# Patient Record
Sex: MALE | URBAN - METROPOLITAN AREA
[De-identification: names, ages, dates, MRNs, and addresses within clinical notes are randomized per-mention and may not be internally consistent; named-entity substitution may affect disease eponyms.]

---

## 2023-06-08 ENCOUNTER — INPATIENT (INPATIENT)
Facility: HOSPITAL | Age: 68
LOS: 5 days | Discharge: NURSING, REHAB OR LONG-TERM CARE | DRG: 640 | End: 2023-06-14
Attending: STUDENT IN AN ORGANIZED HEALTH CARE EDUCATION/TRAINING PROGRAM | Admitting: STUDENT IN AN ORGANIZED HEALTH CARE EDUCATION/TRAINING PROGRAM
Payer: MEDICARE

## 2023-06-08 VITALS
TEMPERATURE: 97 F | HEART RATE: 76 BPM | SYSTOLIC BLOOD PRESSURE: 189 MMHG | RESPIRATION RATE: 20 BRPM | DIASTOLIC BLOOD PRESSURE: 117 MMHG

## 2023-06-08 DIAGNOSIS — E87.1 HYPO-OSMOLALITY AND HYPONATREMIA: ICD-10-CM

## 2023-06-08 LAB
ALBUMIN SERPL ELPH-MCNC: 4.1 G/DL — SIGNIFICANT CHANGE UP (ref 3.3–5)
ALP SERPL-CCNC: 55 U/L — SIGNIFICANT CHANGE UP (ref 40–120)
ALT FLD-CCNC: 18 U/L — SIGNIFICANT CHANGE UP (ref 10–45)
ANION GAP SERPL CALC-SCNC: 14 MMOL/L — SIGNIFICANT CHANGE UP (ref 5–17)
APPEARANCE UR: CLEAR — SIGNIFICANT CHANGE UP
APTT BLD: 23.9 SEC — LOW (ref 27.5–35.5)
AST SERPL-CCNC: 22 U/L — SIGNIFICANT CHANGE UP (ref 10–40)
BACTERIA # UR AUTO: NEGATIVE — SIGNIFICANT CHANGE UP
BASE EXCESS BLDMV CALC-SCNC: -2.2 MMOL/L — SIGNIFICANT CHANGE UP (ref -3–3)
BASOPHILS # BLD AUTO: 0 K/UL — SIGNIFICANT CHANGE UP (ref 0–0.2)
BASOPHILS NFR BLD AUTO: 0 % — SIGNIFICANT CHANGE UP (ref 0–2)
BILIRUB SERPL-MCNC: 1.8 MG/DL — HIGH (ref 0.2–1.2)
BILIRUB UR-MCNC: NEGATIVE — SIGNIFICANT CHANGE UP
BUN SERPL-MCNC: 27 MG/DL — HIGH (ref 7–23)
CALCIUM SERPL-MCNC: 9 MG/DL — SIGNIFICANT CHANGE UP (ref 8.4–10.5)
CHLORIDE SERPL-SCNC: 87 MMOL/L — LOW (ref 96–108)
CO2 BLDMV-SCNC: 26 MMOL/L — SIGNIFICANT CHANGE UP (ref 21–29)
CO2 SERPL-SCNC: 19 MMOL/L — LOW (ref 22–31)
COHGB MFR BLDMV: 0.9 % — SIGNIFICANT CHANGE UP (ref 0–3)
COLOR SPEC: SIGNIFICANT CHANGE UP
CREAT ?TM UR-MCNC: 32 MG/DL — SIGNIFICANT CHANGE UP
CREAT SERPL-MCNC: 1.28 MG/DL — SIGNIFICANT CHANGE UP (ref 0.5–1.3)
DIFF PNL FLD: NEGATIVE — SIGNIFICANT CHANGE UP
EGFR: 61 ML/MIN/1.73M2 — SIGNIFICANT CHANGE UP
EOSINOPHIL # BLD AUTO: 0 K/UL — SIGNIFICANT CHANGE UP (ref 0–0.5)
EOSINOPHIL NFR BLD AUTO: 0 % — SIGNIFICANT CHANGE UP (ref 0–6)
EPI CELLS # UR: 0 /HPF — SIGNIFICANT CHANGE UP
GLUCOSE SERPL-MCNC: 121 MG/DL — HIGH (ref 70–99)
GLUCOSE UR QL: NEGATIVE — SIGNIFICANT CHANGE UP
HCO3 BLDMV-SCNC: 24 MMOL/L — SIGNIFICANT CHANGE UP (ref 20–28)
HCT VFR BLD CALC: 35.8 % — LOW (ref 39–50)
HGB BLD-MCNC: 12.1 G/DL — LOW (ref 13–17)
HGB FLD-MCNC: 13.5 G/DL — SIGNIFICANT CHANGE UP (ref 12.6–17.4)
HYALINE CASTS # UR AUTO: 0 /LPF — SIGNIFICANT CHANGE UP (ref 0–2)
INR BLD: 1.1 RATIO — SIGNIFICANT CHANGE UP (ref 0.88–1.16)
KETONES UR-MCNC: NEGATIVE — SIGNIFICANT CHANGE UP
LEUKOCYTE ESTERASE UR-ACNC: NEGATIVE — SIGNIFICANT CHANGE UP
LYMPHOCYTES # BLD AUTO: 0.5 K/UL — LOW (ref 1–3.3)
LYMPHOCYTES # BLD AUTO: 2.5 % — LOW (ref 13–44)
MANUAL SMEAR VERIFICATION: SIGNIFICANT CHANGE UP
MCHC RBC-ENTMCNC: 32.5 PG — SIGNIFICANT CHANGE UP (ref 27–34)
MCHC RBC-ENTMCNC: 33.8 GM/DL — SIGNIFICANT CHANGE UP (ref 32–36)
MCV RBC AUTO: 96.2 FL — SIGNIFICANT CHANGE UP (ref 80–100)
METHGB MFR BLDMV: 0.3 % — SIGNIFICANT CHANGE UP (ref 0–1.5)
MONOCYTES # BLD AUTO: 0.34 K/UL — SIGNIFICANT CHANGE UP (ref 0–0.9)
MONOCYTES NFR BLD AUTO: 1.7 % — LOW (ref 2–14)
NEUTROPHILS # BLD AUTO: 19.17 K/UL — HIGH (ref 1.8–7.4)
NEUTROPHILS NFR BLD AUTO: 95 % — HIGH (ref 43–77)
NEUTS BAND # BLD: 0.8 % — SIGNIFICANT CHANGE UP (ref 0–8)
NITRITE UR-MCNC: NEGATIVE — SIGNIFICANT CHANGE UP
O2 CT VFR BLD CALC: 20 MMHG — LOW (ref 30–65)
O2 CT VFR BLDMV CALC: 5.4 ML/DL — LOW (ref 18–22)
OSMOLALITY UR: 300 MOS/KG — SIGNIFICANT CHANGE UP (ref 300–900)
OXYHGB MFR BLDMV: 28.2 % — LOW (ref 90–95)
PCO2 BLDMV: 47 MMHG — SIGNIFICANT CHANGE UP (ref 30–65)
PH BLDMV: 7.32 — SIGNIFICANT CHANGE UP (ref 7.32–7.45)
PH UR: 7 — SIGNIFICANT CHANGE UP (ref 5–8)
PLAT MORPH BLD: NORMAL — SIGNIFICANT CHANGE UP
PLATELET # BLD AUTO: 183 K/UL — SIGNIFICANT CHANGE UP (ref 150–400)
POTASSIUM SERPL-MCNC: 4.2 MMOL/L — SIGNIFICANT CHANGE UP (ref 3.5–5.3)
POTASSIUM SERPL-SCNC: 4.2 MMOL/L — SIGNIFICANT CHANGE UP (ref 3.5–5.3)
POTASSIUM UR-SCNC: 26 MMOL/L — SIGNIFICANT CHANGE UP
PROT ?TM UR-MCNC: 36 MG/DL — HIGH (ref 0–12)
PROT SERPL-MCNC: 6.7 G/DL — SIGNIFICANT CHANGE UP (ref 6–8.3)
PROT UR-MCNC: ABNORMAL
PROT/CREAT UR-RTO: 1.1 RATIO — HIGH (ref 0–0.2)
PROTHROM AB SERPL-ACNC: 12.7 SEC — SIGNIFICANT CHANGE UP (ref 10.5–13.4)
RBC # BLD: 3.72 M/UL — LOW (ref 4.2–5.8)
RBC # FLD: 11.8 % — SIGNIFICANT CHANGE UP (ref 10.3–14.5)
RBC BLD AUTO: SIGNIFICANT CHANGE UP
RBC CASTS # UR COMP ASSIST: 0 /HPF — SIGNIFICANT CHANGE UP (ref 0–4)
SAO2 % BLDMV: 28.5 % — LOW (ref 60–90)
SODIUM SERPL-SCNC: 120 MMOL/L — CRITICAL LOW (ref 135–145)
SODIUM UR-SCNC: 60 MMOL/L — SIGNIFICANT CHANGE UP
SP GR SPEC: 1.01 — SIGNIFICANT CHANGE UP (ref 1.01–1.02)
TROPONIN T, HIGH SENSITIVITY RESULT: 11 NG/L — SIGNIFICANT CHANGE UP (ref 0–51)
UROBILINOGEN FLD QL: NEGATIVE — SIGNIFICANT CHANGE UP
WBC # BLD: 20.01 K/UL — HIGH (ref 3.8–10.5)
WBC # FLD AUTO: 20.01 K/UL — HIGH (ref 3.8–10.5)
WBC UR QL: 0 /HPF — SIGNIFICANT CHANGE UP (ref 0–5)

## 2023-06-08 PROCEDURE — 71045 X-RAY EXAM CHEST 1 VIEW: CPT | Mod: 26

## 2023-06-08 PROCEDURE — 99285 EMERGENCY DEPT VISIT HI MDM: CPT

## 2023-06-08 PROCEDURE — 70450 CT HEAD/BRAIN W/O DYE: CPT | Mod: 26,MA,XU

## 2023-06-08 PROCEDURE — 70496 CT ANGIOGRAPHY HEAD: CPT | Mod: 26,MA

## 2023-06-08 PROCEDURE — 70498 CT ANGIOGRAPHY NECK: CPT | Mod: 26,MA

## 2023-06-08 PROCEDURE — 0042T: CPT | Mod: MA

## 2023-06-08 NOTE — ED PROVIDER NOTE - ATTENDING CONTRIBUTION TO CARE
68-year-old male history of CAD with prior CABG, hypertension, hyperlipidemia on aspirin brought in by EMS from home for confusion.  Patient currently living alone and wife living out of state.  Wife thought patient seemed confused over the phone and EMS was called.  Patient unable to contribute to history given confusion, oriented x2 on arrival.  Initially called as a code stroke. Will obtain labs, urinalysis, CT brain imaging, discussed with wife for additional collateral information, anticipate admission

## 2023-06-08 NOTE — ED PROVIDER NOTE - OBJECTIVE STATEMENT
67 y/o m hx htn hld previous cardiac surgery, on asa no blood thinners here for apparent confusion. Living at home alone, got a phone call from friend/wife, did not recognize them on the phone, appeared confused, they called EMS who states pt did not know where he was when they got there. Unclear onset of sx, call was one hour ago but patient unable to state when the last time he spoke with anyone was, phone call to wife went to  here. Pt only complaint is constipation, feels a little confused. no focal pain. no known trama.

## 2023-06-08 NOTE — STROKE CODE NOTE - NIH STROKE SCALE: 1C. LOC COMMANDS, QM
[place] - Nurse from Dr. Pereira Laughter office called to request progress notes from visit on 12/22/16 and lab results from 2/14/17 - requested notes were faxed to [phone]
(0) Performs both tasks correctly

## 2023-06-08 NOTE — ED ADULT NURSE NOTE - OBJECTIVE STATEMENT
67 yo male with pmh HTN, HLD, CAD on ASA presents to ED by EMS for confusion. As per EMS, pt's friend found him confused at home around 1600, unknown LKW. As per EMS, pt "didn't recognize friend, and he didn't know where he was when we got there." Pt a&ox2 disoriented to year, speech clear but aphasia noted, able to move all extremities and follow commands, skin warm dry and appropriate color, sensation intact. Code stroke activated at 1714, pt brought to CT scan with RN, ED MD, and neuro MD. Pt safety measures in place and comfort provided.   Break coverage RN 69 yo male with pmh HTN, HLD, CAD on ASA presents to ED by EMS for confusion. As per EMS, pt's friend found him confused at home around 1600, unknown LKW. As per EMS, pt "didn't recognize friend, and he didn't know where he was when we got there." Pt a&ox2 disoriented to year, aphasia noted, able to move all extremities and follow commands, skin warm dry and appropriate color, sensation intact. Code stroke activated at 1714, pt brought to CT scan with RN, ED MD, and neuro MD. Pt safety measures in place and comfort provided.   Break coverage RN

## 2023-06-08 NOTE — ED PROVIDER NOTE - CLINICAL SUMMARY MEDICAL DECISION MAKING FREE TEXT BOX
pt with vascular rf, on asa, presents with confusion and word finding difficulty. onset at least 1 hour but unclear when LKN. not candidate for tpa given duration + NIH at most 1 for word finding difficulty. will w/u for stroke vs tox/metabolic, ?carbon monoxide, cardiac, infectious etiology. needs more collateral from wife, did not answer phone on first attempt.

## 2023-06-08 NOTE — ED ADULT NURSE REASSESSMENT NOTE - GLASGOW COMA SCALE: BEST VERBAL RESPONSE
(V5) oriented
Implemented All Fall with Harm Risk Interventions:  [place] to call system. Call bell, personal items and telephone within reach. Instruct patient to call for assistance. Room bathroom lighting operational. Non-slip footwear when patient is off stretcher. Physically safe environment: no spills, clutter or unnecessary equipment. Stretcher in lowest position, wheels locked, appropriate side rails in place. Provide visual cue, wrist band, yellow gown, etc. Monitor gait and stability. Monitor for mental status changes and reorient to person, place, and time. Review medications for side effects contributing to fall risk. Reinforce activity limits and safety measures with patient and family. Provide visual clues: red socks.

## 2023-06-08 NOTE — ED PROVIDER NOTE - PHYSICAL EXAMINATION
Vitals: I have reviewed the patients vital signs  General: Well dressed, well appearing, no acute distress  HEENT: Atraumatic, normocephalic, airway patent  Neck: no tracheal deviation, no JVD  Chest/Lungs: no trauma, symmetric chest rise, speaking in complete sentences, no WOB  Heart: skin and extremities well perfused, regular rate and rhythm  Neuro: A+Ox2-3 (knows name, birthday, month but not year, repeats words when asked questions), CN II-XII intact, speech coherent and non dysarthric but he does pause to find words at time gait deferred for code stroke finger-nose and heel-shin testing normal. Muscle strength at baseline in all extremities  Eyes: PERRL, EOMI, no conjunctival injection   MSK: strength at baseline in all extremities, no muscle wasting or atrophy  Skin: no cyanosis, no jaundice, no new emergent lesions

## 2023-06-08 NOTE — ED ADULT NURSE REASSESSMENT NOTE - NURSING NEURO ORIENTATION
oriented to person, place and time
disoriented to place/disoriented to time/situation

## 2023-06-08 NOTE — ED ADULT NURSE NOTE - NSFALLRISKINTERV_ED_ALL_ED
Assistance OOB with selected safe patient handling equipment if applicable/Communicate fall risk and risk factors to all staff, patient, and family/Monitor for mental status changes and reorient to person, place, and time, as needed/Move patient closer to nursing station/within visual sight of ED staff/Provide patient with walking aids/Provide visual cue: yellow wristband, yellow gown, etc/Reinforce activity limits and safety measures with patient and family/Toileting schedule using arm’s reach rule for commode and bathroom/Use of alarms - bed, stretcher, chair and/or video monitoring/Call bell, personal items and telephone in reach/Instruct patient to call for assistance before getting out of bed/chair/stretcher/Non-slip footwear applied when patient is off stretcher/[place] to call system/Physically safe environment - no spills, clutter or unnecessary equipment/Purposeful Proactive Rounding/Room/bathroom lighting operational, light cord in reach

## 2023-06-08 NOTE — ED PROVIDER NOTE - PROGRESS NOTE DETAILS
Spoke to patient's wife, Ambika Michael, on the phone [phone].  As per his wife patient with last known normal today at 2 PM when she spoke with him over the phone.  Wife is currently in [place] and patient has been residing here for the last 50 days.  He appeared altered during the conversation she had with him on the phone at 4 PM today.  Patient has a history of atrial myxoma, CAD with prior CABG, remote cholecystectomy, hypertension hyperlipidemia and is on a baby aspirin daily.  Patient's wife denies similar prior episodes.  Of note, he has been complaining of recent constipation with difficulty having bowel movements.  Patient's wife denies the use of over-the-counter medications.  Patient's primary care doctor is Dr. José Miguel wesley who practices in [place], phone #[phone].  Patient's wife updated on his status and need for admission for further work-up.

## 2023-06-08 NOTE — ED ADULT NURSE NOTE - CAS EDN DISCHARGE INTERVENTIONS
IV intact Chonodrocutaneous Helical Advancement Flap Text: The defect edges were debeveled with a #15 scalpel blade.  Given the location of the defect and the proximity to free margins a chondrocutaneous helical advancement flap was deemed most appropriate.  Using a sterile surgical marker, the appropriate advancement flap was drawn incorporating the defect and placing the expected incisions within the relaxed skin tension lines where possible.    The area thus outlined was incised deep to adipose tissue with a #15 scalpel blade.  The skin margins were undermined to an appropriate distance in all directions utilizing iris scissors.

## 2023-06-08 NOTE — STROKE CODE NOTE - NSMDCONSULT QTN_Y FT
Code stroke called for confusion and word difficulty. Symptoms noticed around 1615 when friend found him confused at home and called EMS. Unknown Last Known Normal. Multiple attempts made to reach patient's family over phone but unable to contact. Wife was called at  multiple times but did not  phone. Due to unknown last known well, patient not meeting criteria for tenecteplase at this time and Code stroke was cancelled with agreement of stroke team and ED physician. Patient has 5/5 strength upper and lower extremities, no sensory deficit, VFF to confrontation, no dysmetria FTN. Does have some word-finding difficulty and mixing up his words. Following commands but perseverates. Labwork notable for WBC 20, hyponatremia. CTH no bleed. Not a tenecteplase or thrombectomy candidate at this time for unknown LKN. Case discussed with stroke fellow on call. Code stroke called for confusion and word difficulty. Symptoms noticed around 1615 when friend found him confused at home and called EMS. Unknown Last Known Normal. Multiple attempts made to reach patient's family over phone but unable to contact. Wife was called at  multiple times but did not  phone. Patient's main complaint at this time is constipation, denies any headache, any weakness or sensory deficits, room-spinning dizziness or vision changes. Due to unknown last known well, patient not meeting criteria for tenecteplase at this time and Code stroke was cancelled with agreement of stroke team and ED physician. Patient has 5/5 strength upper and lower extremities, no sensory deficit, VFF to confrontation, no dysmetria FTN. Does have some word-finding difficulty and mixing up his words. Following commands but perseverates. Labwork notable for WBC 20, hyponatremia. CTH no bleed. Not a tenecteplase or thrombectomy candidate at this time for unknown LKN. Case discussed with stroke fellow on call.    Constitutional: well-developed, well-nourished, well-groomed  Mental Status: Alert, awake, oriented to person, place [facility] [place], and Month year but not date. Speech: clearly has some word-finding difficulty with intact naming, able to repeat, follows 1-step commands.   Cranial Nerves:  II: Visual fields are full to confrontation; pupils are equal, round, and reactive to light   III, IV, VI: Extraocular movements are intact without nystagmus  V: Facial sensation is intact in the V1-V3 distribution bilaterally  VII: Face is symmetric with normal eye closure and smile  VIII: Hearing is intact to finger rub  IX, X: Uvula is midline  XI: Shoulder shrug intact  XII: Tongue protrudes in the midline    - Motor/Strength Testing:                                 Right           Left  Deltoid                     5                 5  Biceps                      5                 5  Triceps                     5                 5  Hand                  5                 5    Hip Flex                   5                  5  Knee Ext	                 5                  5  Dorsiflex                  5                  5  Plantarflex               5                  5    There is no pronator drift. Normal muscle bulk and tone throughout.  Sensory: Intact throughout to light touch.   Coordination: Finger-nose-finger intact without ataxia or dysmetria.      Labs remarkable for leukocytosis and hyponatremia.   Recommend toxic/metabolic/infectious workup and treatment of underlying causes.   Follow up on official CTA read.   Please consult us if concern for neurologic causes of speech disturbance after treatment of toxic/metabolic/infectious causes.   Please page [phone] with any further neurological questions.

## 2023-06-09 DIAGNOSIS — E78.5 HYPERLIPIDEMIA, UNSPECIFIED: ICD-10-CM

## 2023-06-09 DIAGNOSIS — I10 ESSENTIAL (PRIMARY) HYPERTENSION: ICD-10-CM

## 2023-06-09 DIAGNOSIS — G93.40 ENCEPHALOPATHY, UNSPECIFIED: ICD-10-CM

## 2023-06-09 DIAGNOSIS — D72.829 ELEVATED WHITE BLOOD CELL COUNT, UNSPECIFIED: ICD-10-CM

## 2023-06-09 DIAGNOSIS — R09.89 OTHER SPECIFIED SYMPTOMS AND SIGNS INVOLVING THE CIRCULATORY AND RESPIRATORY SYSTEMS: ICD-10-CM

## 2023-06-09 DIAGNOSIS — E87.1 HYPO-OSMOLALITY AND HYPONATREMIA: ICD-10-CM

## 2023-06-09 DIAGNOSIS — Z90.49 ACQUIRED ABSENCE OF OTHER SPECIFIED PARTS OF DIGESTIVE TRACT: Chronic | ICD-10-CM

## 2023-06-09 DIAGNOSIS — I25.10 ATHEROSCLEROTIC HEART DISEASE OF NATIVE CORONARY ARTERY WITHOUT ANGINA PECTORIS: ICD-10-CM

## 2023-06-09 DIAGNOSIS — Z29.9 ENCOUNTER FOR PROPHYLACTIC MEASURES, UNSPECIFIED: ICD-10-CM

## 2023-06-09 LAB
ALBUMIN SERPL ELPH-MCNC: 3.9 G/DL — SIGNIFICANT CHANGE UP (ref 3.3–5)
ALP SERPL-CCNC: 50 U/L — SIGNIFICANT CHANGE UP (ref 40–120)
ALT FLD-CCNC: 14 U/L — SIGNIFICANT CHANGE UP (ref 10–45)
ANION GAP SERPL CALC-SCNC: 13 MMOL/L — SIGNIFICANT CHANGE UP (ref 5–17)
ANION GAP SERPL CALC-SCNC: 14 MMOL/L — SIGNIFICANT CHANGE UP (ref 5–17)
AST SERPL-CCNC: 17 U/L — SIGNIFICANT CHANGE UP (ref 10–40)
BASOPHILS # BLD AUTO: 0.02 K/UL — SIGNIFICANT CHANGE UP (ref 0–0.2)
BASOPHILS NFR BLD AUTO: 0.2 % — SIGNIFICANT CHANGE UP (ref 0–2)
BILIRUB SERPL-MCNC: 2.9 MG/DL — HIGH (ref 0.2–1.2)
BUN SERPL-MCNC: 20 MG/DL — SIGNIFICANT CHANGE UP (ref 7–23)
BUN SERPL-MCNC: 26 MG/DL — HIGH (ref 7–23)
CALCIUM SERPL-MCNC: 9.1 MG/DL — SIGNIFICANT CHANGE UP (ref 8.4–10.5)
CALCIUM SERPL-MCNC: 9.1 MG/DL — SIGNIFICANT CHANGE UP (ref 8.4–10.5)
CHLORIDE SERPL-SCNC: 90 MMOL/L — LOW (ref 96–108)
CHLORIDE SERPL-SCNC: 92 MMOL/L — LOW (ref 96–108)
CO2 SERPL-SCNC: 19 MMOL/L — LOW (ref 22–31)
CO2 SERPL-SCNC: 22 MMOL/L — SIGNIFICANT CHANGE UP (ref 22–31)
CREAT SERPL-MCNC: 1.25 MG/DL — SIGNIFICANT CHANGE UP (ref 0.5–1.3)
CREAT SERPL-MCNC: 1.47 MG/DL — HIGH (ref 0.5–1.3)
EGFR: 52 ML/MIN/1.73M2 — LOW
EGFR: 63 ML/MIN/1.73M2 — SIGNIFICANT CHANGE UP
EOSINOPHIL # BLD AUTO: 0.13 K/UL — SIGNIFICANT CHANGE UP (ref 0–0.5)
EOSINOPHIL NFR BLD AUTO: 1.2 % — SIGNIFICANT CHANGE UP (ref 0–6)
GLUCOSE SERPL-MCNC: 101 MG/DL — HIGH (ref 70–99)
GLUCOSE SERPL-MCNC: 93 MG/DL — SIGNIFICANT CHANGE UP (ref 70–99)
HCT VFR BLD CALC: 35 % — LOW (ref 39–50)
HCV AB S/CO SERPL IA: 0.12 S/CO — SIGNIFICANT CHANGE UP (ref 0–0.99)
HCV AB SERPL-IMP: SIGNIFICANT CHANGE UP
HGB BLD-MCNC: 12.1 G/DL — LOW (ref 13–17)
IMM GRANULOCYTES NFR BLD AUTO: 0.4 % — SIGNIFICANT CHANGE UP (ref 0–0.9)
LEGIONELLA AG UR QL: NEGATIVE — SIGNIFICANT CHANGE UP
LYMPHOCYTES # BLD AUTO: 1.25 K/UL — SIGNIFICANT CHANGE UP (ref 1–3.3)
LYMPHOCYTES # BLD AUTO: 11.2 % — LOW (ref 13–44)
MAGNESIUM SERPL-MCNC: 1.8 MG/DL — SIGNIFICANT CHANGE UP (ref 1.6–2.6)
MCHC RBC-ENTMCNC: 32.4 PG — SIGNIFICANT CHANGE UP (ref 27–34)
MCHC RBC-ENTMCNC: 34.6 GM/DL — SIGNIFICANT CHANGE UP (ref 32–36)
MCV RBC AUTO: 93.8 FL — SIGNIFICANT CHANGE UP (ref 80–100)
MONOCYTES # BLD AUTO: 0.79 K/UL — SIGNIFICANT CHANGE UP (ref 0–0.9)
MONOCYTES NFR BLD AUTO: 7 % — SIGNIFICANT CHANGE UP (ref 2–14)
NEUTROPHILS # BLD AUTO: 8.97 K/UL — HIGH (ref 1.8–7.4)
NEUTROPHILS NFR BLD AUTO: 80 % — HIGH (ref 43–77)
NRBC # BLD: 0 /100 WBCS — SIGNIFICANT CHANGE UP (ref 0–0)
PLATELET # BLD AUTO: 162 K/UL — SIGNIFICANT CHANGE UP (ref 150–400)
POTASSIUM SERPL-MCNC: 4.2 MMOL/L — SIGNIFICANT CHANGE UP (ref 3.5–5.3)
POTASSIUM SERPL-MCNC: 4.3 MMOL/L — SIGNIFICANT CHANGE UP (ref 3.5–5.3)
POTASSIUM SERPL-SCNC: 4.2 MMOL/L — SIGNIFICANT CHANGE UP (ref 3.5–5.3)
POTASSIUM SERPL-SCNC: 4.3 MMOL/L — SIGNIFICANT CHANGE UP (ref 3.5–5.3)
PROCALCITONIN SERPL-MCNC: 0.43 NG/ML — HIGH (ref 0.02–0.1)
PROT SERPL-MCNC: 6.3 G/DL — SIGNIFICANT CHANGE UP (ref 6–8.3)
RBC # BLD: 3.73 M/UL — LOW (ref 4.2–5.8)
RBC # FLD: 11.7 % — SIGNIFICANT CHANGE UP (ref 10.3–14.5)
SODIUM SERPL-SCNC: 123 MMOL/L — LOW (ref 135–145)
SODIUM SERPL-SCNC: 127 MMOL/L — LOW (ref 135–145)
TSH SERPL-MCNC: 1.1 UIU/ML — SIGNIFICANT CHANGE UP (ref 0.27–4.2)
TSH SERPL-MCNC: 1.18 UIU/ML — SIGNIFICANT CHANGE UP (ref 0.27–4.2)
URATE SERPL-MCNC: 4.9 MG/DL — SIGNIFICANT CHANGE UP (ref 3.4–8.8)
UUN UR-MCNC: 303 MG/DL — SIGNIFICANT CHANGE UP
WBC # BLD: 11.21 K/UL — HIGH (ref 3.8–10.5)
WBC # FLD AUTO: 11.21 K/UL — HIGH (ref 3.8–10.5)

## 2023-06-09 PROCEDURE — 99223 1ST HOSP IP/OBS HIGH 75: CPT

## 2023-06-09 RX ORDER — LANOLIN ALCOHOL/MO/W.PET/CERES
3 CREAM (GRAM) TOPICAL AT BEDTIME
Refills: 0 | Status: DISCONTINUED | OUTPATIENT
Start: 2023-06-09 | End: 2023-06-14

## 2023-06-09 RX ORDER — ASPIRIN/CALCIUM CARB/MAGNESIUM 324 MG
81 TABLET ORAL DAILY
Refills: 0 | Status: DISCONTINUED | OUTPATIENT
Start: 2023-06-09 | End: 2023-06-14

## 2023-06-09 RX ORDER — ATORVASTATIN CALCIUM 80 MG/1
40 TABLET, FILM COATED ORAL DAILY
Refills: 0 | Status: DISCONTINUED | OUTPATIENT
Start: 2023-06-09 | End: 2023-06-14

## 2023-06-09 RX ORDER — LOSARTAN POTASSIUM 100 MG/1
100 TABLET, FILM COATED ORAL DAILY
Refills: 0 | Status: DISCONTINUED | OUTPATIENT
Start: 2023-06-09 | End: 2023-06-14

## 2023-06-09 RX ORDER — ACETAMINOPHEN 500 MG
650 TABLET ORAL EVERY 6 HOURS
Refills: 0 | Status: DISCONTINUED | OUTPATIENT
Start: 2023-06-09 | End: 2023-06-14

## 2023-06-09 RX ORDER — CHOLECALCIFEROL (VITAMIN D3) 125 MCG
2000 CAPSULE ORAL DAILY
Refills: 0 | Status: DISCONTINUED | OUTPATIENT
Start: 2023-06-09 | End: 2023-06-14

## 2023-06-09 RX ORDER — SODIUM CHLORIDE 9 MG/ML
1000 INJECTION INTRAMUSCULAR; INTRAVENOUS; SUBCUTANEOUS
Refills: 0 | Status: DISCONTINUED | OUTPATIENT
Start: 2023-06-09 | End: 2023-06-11

## 2023-06-09 RX ADMIN — Medication 81 MILLIGRAM(S): at 12:34

## 2023-06-09 RX ADMIN — SODIUM CHLORIDE 50 MILLILITER(S): 9 INJECTION INTRAMUSCULAR; INTRAVENOUS; SUBCUTANEOUS at 19:10

## 2023-06-09 RX ADMIN — Medication 2000 UNIT(S): at 12:35

## 2023-06-09 RX ADMIN — ATORVASTATIN CALCIUM 40 MILLIGRAM(S): 80 TABLET, FILM COATED ORAL at 12:35

## 2023-06-09 RX ADMIN — LOSARTAN POTASSIUM 100 MILLIGRAM(S): 100 TABLET, FILM COATED ORAL at 05:17

## 2023-06-09 NOTE — H&P ADULT - ASSESSMENT
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 CAD s/p CABG to LAD 2013, HTN, HLD p/w acute encephalopathy in setting of leukocytosis and hyponatremia

## 2023-06-09 NOTE — H&P ADULT - PROBLEM SELECTOR PLAN 1
Likely metabolic in nature at this point. Appreciate stroke code note. CTH w/o acute findings  -Trend BMP and NA  -F/u hyponatremia work up  -Trend wbc, fever curve. Will watch off antibiotics for now  -F/u procalcitonin  -Plan to correct metabolic abnormalities and reassess  -Aspiration precautions, falls precautions  -Please proactively update wife Rojas   -Wife endorsing instructions by patient's MD to not take erythromycin, dextromorphan, Phenylephrine, fentanyl patch, Nyquil, menthol, naproxen, mobic and prednisone. Maybe not take amoxicillin too but not sure. She is not sure why for any of these medications.

## 2023-06-09 NOTE — PATIENT PROFILE ADULT - MEDICATION/VISITS - DETAILS
pt states he hasn't seen an internist in years and hasn't has a physical exam in a long time to save money

## 2023-06-09 NOTE — H&P ADULT - NSHPPHYSICALEXAM_GEN_ALL_CORE
Vital Signs Last 24 Hrs  T(C): 36.9 (06-08-23 @ 23:00), Max: 36.9 (06-08-23 @ 23:00)  T(F): 98.4 (06-08-23 @ 23:00), Max: 98.4 (06-08-23 @ 23:00)  HR: 86 (06-08-23 @ 23:00) (76 - 86)  BP: 143/86 (06-08-23 @ 23:00) (143/86 - 189/117)  BP(mean): --  RR: 16 (06-08-23 @ 23:00) (16 - 20)  SpO2: 98% (06-08-23 @ 23:00) (98% - 100%)

## 2023-06-09 NOTE — H&P ADULT - NSICDXPASTMEDICALHX_GEN_ALL_CORE_FT
PAST MEDICAL HISTORY:  CAD (coronary artery disease)     Cardiac myxoma     Essential hypertension     Hyperlipidemia

## 2023-06-09 NOTE — H&P ADULT - HISTORY OF PRESENT ILLNESS
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 CAD s/p CABG to LAD 2013, HTN, HLD p/w AMS. Pt splits time between [place] and [place]. Has been in [place] for between 1-2 months while his wife has been in [place]. Has been in contact with wife. Today, he was telling wife that he has been constipated for past 2-3 days. Unable to have BM. Wife suggested pt try to disimpact himself. Pt reports drinking lots of water during day and straining on toilet. Cannot specify how much. Did not take laxatives or meds. Ended up trying to disimpact himself in the afternoon and was successful afterwards. Called wife back around 4pm and wife felt he was very confused and did not make sense. Had friend come evaluate him and EMS was called to bring patient to hospital. Pt denies any fevers or chills. Unable to provide accurate ROS but does endorse similar timeline of events during day.

## 2023-06-09 NOTE — PATIENT PROFILE ADULT - FALL HARM RISK - HARM RISK INTERVENTIONS

## 2023-06-09 NOTE — H&P ADULT - NSHPLABSRESULTS_GEN_ALL_CORE
I have reviewed the labs, imaging and ekg. CXR w/o focal consolidations, CTH w/o clear acute findings

## 2023-06-09 NOTE — H&P ADULT - NSHPSOCIALHISTORY_GEN_ALL_CORE
Former 1ppd smoker quit at age 55. No ETOH. 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 . Former 1ppd smoker quit at age 55. No ETOH. Works in [employer] industry. . Drinks 2 cups of coffee everyday,

## 2023-06-09 NOTE — H&P ADULT - PROBLEM SELECTOR PLAN 2
Does not seem to be on thiazide diuretics. Does endorse increased water intake today but cannot specify how much.  -Trend BMP, will hold off on IVF for now, try to limit too aggressive correction  -F/u urine NA, urine osmolality  -TSH

## 2023-06-09 NOTE — H&P ADULT - NSHPOUTPATIENTPROVIDERS_GEN_ALL_CORE
Patient's primary care doctor is Dr. José Miguel wesley who practices in [place], phone #[phone]

## 2023-06-09 NOTE — CONSULT NOTE ADULT - SUBJECTIVE AND OBJECTIVE BOX
seen full consult to follow. check uric acid ordered.repeat na in 8 hr if not improving then NS IVF will start seen full consult to follow. check uric acid ordered.repeat na in 8 hr if not improving then NS IVF will start      [facility]  [phone]  NEPHROLOGY      INITIAL CONSULT NOTE  --------------------------------------------------------------------------------  HPI:      68M w/ hx of cardiac myxoma? CAD s/p CABG to LAD 2013, HTN, HLD p/w AMS. Pt splits time between [place] and [place]. Has been in [place] for between 1-2 months while his wife has been in [place]. Has been in contact with wife. Today, he was telling wife that he has been constipated for past 2-3 days. Unable to have BM. Wife suggested pt try to disimpact himself. Pt reports drinking lots of water during day and straining on toilet. Cannot specify how much. Did not take laxatives or meds. Ended up trying to disimpact himself in the afternoon and was successful afterwards. Called wife back around 4pm and wife felt he was very confused and did not make sense. Had friend come evaluate him and EMS was called to bring patient to hospital. as reported was Unable to provide accurate ROS on admission. pt noticed with na 121. slowly started to rise. renal consult called.   denies taking diuretics       PAST HISTORY  --------------------------------------------------------------------------------  PAST MEDICAL & SURGICAL HISTORY:  CAD (coronary artery disease)      Essential hypertension      Hyperlipidemia      Cardiac myxoma      S/P cholecystectomy        FAMILY HISTORY:    PAST SOCIAL HISTORY:    ALLERGIES & MEDICATIONS  --------------------------------------------------------------------------------  Allergies    erythromycin (Unknown)    Intolerances      Standing Inpatient Medications  aspirin enteric coated 81 milliGRAM(s) Oral daily  atorvastatin 40 milliGRAM(s) Oral daily  cholecalciferol 2000 Unit(s) Oral daily  losartan 100 milliGRAM(s) Oral daily  sodium chloride 0.9%. 1000 milliLiter(s) IV Continuous <Continuous>    PRN Inpatient Medications  acetaminophen     Tablet .. 650 milliGRAM(s) Oral every 6 hours PRN  melatonin 3 milliGRAM(s) Oral at bedtime PRN      REVIEW OF SYSTEMS  --------------------------------------------------------------------------------  Gen: No  fevers/chills   Skin: No rashes  Head/Eyes/Ears/Mouth: No headache; Normal hearing;  No sinus pain/discomfort, sore throat  Respiratory: No dyspnea, cough, wheezing, hemoptysis  CV: No chest pain, orthopnea  GI: No abdominal pain, diarrhea, nausea, vomiting, melena, hematochezia  : No dysuria, decrease urination or hesitancy urinating  hematuria, nocturia  MSK: No joint pain/swelling; no back pain  Neuro: No dizziness/lightheadedness,also with no edema       VITALS/PHYSICAL EXAM  --------------------------------------------------------------------------------  T(C): 36.4 (06-09-23 @ 10:37), Max: 37.1 (06-09-23 @ 05:05)  HR: 79 (06-09-23 @ 10:37) (74 - 79)  BP: 128/70 (06-09-23 @ 10:37) (128/70 - 169/87)  RR: 18 (06-09-23 @ 10:37) (17 - 18)  SpO2: 96% (06-09-23 @ 10:37) (96% - 97%)  Wt(kg): --  Height (cm): 170.2 (06-09-23 @ 00:36)  Weight (kg): 72.6 (06-09-23 @ 00:36)  BMI (kg/m2): 25.1 (06-09-23 @ 00:36)  BSA (m2): 1.84 (06-09-23 @ 00:36)      06-08-23 @ 07:01  -  06-09-23 @ 07:00  --------------------------------------------------------  IN: 0 mL / OUT: 400 mL / NET: -400 mL    06-09-23 @ 07:01  -  06-09-23 @ 23:08  --------------------------------------------------------  IN: 720 mL / OUT: 800 mL / NET: -80 mL      Physical Exam:  	Gen: Non toxic comfortable appearing    	no jvd  	Pulm: decrease bs  no rales or ronchi or wheezing  	CV: RRR, S1S2; no rub  	Back: No CVA tenderness;  	Abd: +BS, soft, nontender/nondistended  	: No suprapubic tenderness  	UE: Warm, no cyanosis  no clubbing,  no edema   	LE: Warm, no cyanosis  no clubbing, no edema  	Neuro: alert and oriented. speech coherent   	Psych: Normal affect and mood  	Skin: Warm, no decrease skin turgor   	    LABS/STUDIES  --------------------------------------------------------------------------------              12.1   11.21 >-----------<  162      [06-09-23 @ 07:07]              35.0     127  |  92  |  26  ----------------------------<  93      [06-09-23 @ 18:02]  4.2   |  22  |  1.47        Ca     9.1     [06-09-23 @ 18:02]      Mg     1.8     [06-09-23 @ 07:06]    TPro  6.3  /  Alb  3.9  /  TBili  2.9  /  DBili  x   /  AST  17  /  ALT  14  /  AlkPhos  50  [06-09-23 @ 07:06]    PT/INR: PT 12.7 , INR 1.10       [06-08-23 @ 17:23]  PTT: 23.9       [06-08-23 @ 17:23]    Uric acid 4.9      [06-09-23 @ 07:06]    Creatinine Trend:  SCr 1.47 [06-09 @ 18:02]  SCr 1.25 [06-09 @ 07:06]  SCr 1.28 [06-08 @ 17:23]    Urinalysis - [06-08-23 @ 19:17]      Color Light Yellow / Appearance Clear / SG 1.015 / pH 7.0      Gluc Negative / Ketone Negative  / Bili Negative / Urobili Negative       Blood Negative / Protein 30 mg/dL / Leuk Est Negative / Nitrite Negative      RBC 0 / WBC 0 / Hyaline 0 / Gran  / Sq Epi  / Non Sq Epi  / Bacteria Negative    Urine Creatinine 32      [06-08-23 @ 19:17]  Urine Protein 36      [06-08-23 @ 19:17]  Urine Sodium 60      [06-08-23 @ 19:17]  Urine Urea Nitrogen 303      [06-08-23 @ 19:17]  Urine Potassium 26      [06-08-23 @ 19:17]  Urine Osmolality 300      [06-08-23 @ 19:17]    TSH 1.18      [06-09-23 @ 07:11]    HCV 0.12, Nonreact      [06-09-23 @ 07:11]

## 2023-06-09 NOTE — H&P ADULT - PROBLEM SELECTOR PLAN 3
Maybe related to recent self disimpaction. No other clear signs of infection right now, UA and CXR w/o acute findings.  -Trend wbc  -Trend fever curve  -Will send procalcitonin  -Will watch off antibiotics for now Maybe related to recent self disimpaction. No other clear signs of infection right now, UA and CXR w/o acute findings.  -Trend wbc  -Trend fever curve  -Will send procalcitonin  -Will watch off antibiotics for now  -Consider CT abd/pelvis if wbc not improving

## 2023-06-09 NOTE — H&P ADULT - TIME BILLING
Need to interview and examine patient, discuss care with family over multiple phone calls, discuss case with neurology, provide counseling, and review prior medical records

## 2023-06-09 NOTE — PROGRESS NOTE ADULT - SUBJECTIVE AND OBJECTIVE BOX
patient somewhat confused, no complaints.    GENERAL: No fevers, no chills.  EYES: No blurry vision,  No photophobia  ENT: No sore throat.  No dysphagia  Cardiovascular: No chest pain, palpitations, orthopnea  Pulmonary: No cough, no wheezing. No shortness of breath  Gastrointestinal: No abdominal pain, no diarrhea, no constipation.   Musculoskeletal: No weakness.  No myalgias.  Dermatology:  No rashes.  Neuro: No Headache.  No vertigo.  No dizziness.  Psych: No anxiety, no depression.  Denies suicidal thoughts.    MEDICATIONS  (STANDING):  aspirin enteric coated 81 milliGRAM(s) Oral daily  atorvastatin 40 milliGRAM(s) Oral daily  cholecalciferol 2000 Unit(s) Oral daily  losartan 100 milliGRAM(s) Oral daily    MEDICATIONS  (PRN):  acetaminophen     Tablet .. 650 milliGRAM(s) Oral every 6 hours PRN Temp greater or equal to 38C (100.4F), Mild Pain (1 - 3)  melatonin 3 milliGRAM(s) Oral at bedtime PRN Insomnia    Vital Signs Last 24 Hrs  T(C): 36.4 (2023 10:37), Max: 37.1 (2023 05:05)  T(F): 97.5 (2023 10:37), Max: 98.7 (2023 05:05)  HR: 79 (2023 10:37) (74 - 86)  BP: 128/70 (2023 10:37) (128/70 - 189/117)  BP(mean): --  RR: 18 (2023 10:37) (16 - 20)  SpO2: 96% (2023 10:37) (96% - 100%)    Parameters below as of 2023 10:37  Patient On (Oxygen Delivery Method): room air    GENERAL: NAD  HEAD:  Atraumatic, Normocephalic  EYES: EOMI, PERRLA, conjunctiva and sclera clear  ENT: Pharynx not erythematous  PULMONARY: Clear to auscultation bilaterally; No wheeze  CARDIOVASCULAR: Regular rate and rhythm; No murmurs, rubs, or gallops  ABDOMEN: Soft, Nontender, Nondistended; Bowel sounds present  EXTREMITIES:  2+ Peripheral Pulses, No clubbing, cyanosis, or edema  MUSCULOSKELETAL: No calf tenderness  PSYCH: AAOx3, normal affect  SKIN: warm and dry, No rashes or lesions    .  LABS:                         12.1   11.21 )-----------( 162      ( 2023 07:07 )             35.0     06-    123<L>  |  90<L>  |  20  ----------------------------<  101<H>  4.3   |  19<L>  |  1.25    Ca    9.1      2023 07:06  Mg     1.8     06-    TPro  6.3  /  Alb  3.9  /  TBili  2.9<H>  /  DBili  x   /  AST  17  /  ALT  14  /  AlkPhos  50  06-09    PT/INR - ( 2023 17:23 )   PT: 12.7 sec;   INR: 1.10 ratio         PTT - ( 2023 17:23 )  PTT:23.9 sec  Urinalysis Basic - ( 2023 19:17 )    Color: Light Yellow / Appearance: Clear / S.015 / pH: x  Gluc: x / Ketone: Negative  / Bili: Negative / Urobili: Negative   Blood: x / Protein: 30 mg/dL / Nitrite: Negative   Leuk Esterase: Negative / RBC: 0 /hpf / WBC 0 /HPF   Sq Epi: x / Non Sq Epi: x / Bacteria: Negative            RADIOLOGY, EKG & ADDITIONAL TESTS: Reviewed.  no complaints.    GENERAL: No fevers, no chills.  EYES: No blurry vision,  No photophobia  ENT: No sore throat.  No dysphagia  Cardiovascular: No chest pain, palpitations, orthopnea  Pulmonary: No cough, no wheezing. No shortness of breath  Gastrointestinal: No abdominal pain, no diarrhea, no constipation.   Musculoskeletal: No weakness.  No myalgias.  Dermatology:  No rashes.  Neuro: No Headache.  No vertigo.  No dizziness.  Psych: No anxiety, no depression.  Denies suicidal thoughts.    MEDICATIONS  (STANDING):  aspirin enteric coated 81 milliGRAM(s) Oral daily  atorvastatin 40 milliGRAM(s) Oral daily  cholecalciferol 2000 Unit(s) Oral daily  losartan 100 milliGRAM(s) Oral daily    MEDICATIONS  (PRN):  acetaminophen     Tablet .. 650 milliGRAM(s) Oral every 6 hours PRN Temp greater or equal to 38C (100.4F), Mild Pain (1 - 3)  melatonin 3 milliGRAM(s) Oral at bedtime PRN Insomnia    Vital Signs Last 24 Hrs  T(C): 36.4 (2023 10:37), Max: 37.1 (2023 05:05)  T(F): 97.5 (2023 10:37), Max: 98.7 (2023 05:05)  HR: 79 (2023 10:37) (74 - 86)  BP: 128/70 (2023 10:37) (128/70 - 189/117)  BP(mean): --  RR: 18 (2023 10:37) (16 - 20)  SpO2: 96% (2023 10:37) (96% - 100%)    Parameters below as of 2023 10:37  Patient On (Oxygen Delivery Method): room air    GENERAL: NAD  HEAD:  Atraumatic, Normocephalic  EYES: EOMI, PERRLA, conjunctiva and sclera clear  ENT: Pharynx not erythematous  PULMONARY: Clear to auscultation bilaterally; No wheeze  CARDIOVASCULAR: Regular rate and rhythm; No murmurs, rubs, or gallops  ABDOMEN: Soft, Nontender, Nondistended; Bowel sounds present  EXTREMITIES:  2+ Peripheral Pulses, No clubbing, cyanosis, or edema  MUSCULOSKELETAL: No calf tenderness  PSYCH: AAOx3, normal affect  SKIN: warm and dry, No rashes or lesions    .  LABS:                         12.1   11.21 )-----------( 162      ( 2023 07:07 )             35.0     06-    123<L>  |  90<L>  |  20  ----------------------------<  101<H>  4.3   |  19<L>  |  1.25    Ca    9.1      2023 07:06  Mg     1.8     06-    TPro  6.3  /  Alb  3.9  /  TBili  2.9<H>  /  DBili  x   /  AST  17  /  ALT  14  /  AlkPhos  50  06-09    PT/INR - ( 2023 17:23 )   PT: 12.7 sec;   INR: 1.10 ratio         PTT - ( 2023 17:23 )  PTT:23.9 sec  Urinalysis Basic - ( 2023 19:17 )    Color: Light Yellow / Appearance: Clear / S.015 / pH: x  Gluc: x / Ketone: Negative  / Bili: Negative / Urobili: Negative   Blood: x / Protein: 30 mg/dL / Nitrite: Negative   Leuk Esterase: Negative / RBC: 0 /hpf / WBC 0 /HPF   Sq Epi: x / Non Sq Epi: x / Bacteria: Negative            RADIOLOGY, EKG & ADDITIONAL TESTS: Reviewed.

## 2023-06-09 NOTE — CONSULT NOTE ADULT - ASSESSMENT
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 CAD s/p CABG to LAD 2013, HTN, HLD p/w AMS. Pt splits time between [place] and [place]. Has been in [place] for between 1-2 months while his wife has been in [place]. Has been in contact with wife. Today, he was telling wife that he has been constipated for past 2-3 days. Unable to have BM. Wife suggested pt try to disimpact himself. Pt reports drinking lots of water during day and straining on toilet. Cannot specify how much. Did not take laxatives or meds. Ended up trying to disimpact himself in the afternoon and was successful afterwards. Called wife back around 4pm and wife felt he was very confused and did not make sense. Had friend come evaluate him and EMS was called to bring patient to hospital. as reported was Unable to provide accurate ROS on admission. pt noticed with na 121. slowly started to rise.  denies taking diuretics         1-  hyponatremia   2- abnormal serum creatinine   3- HTN   4- cad s/p cabg     unclear cause of his hyponatremia.  ? tea and toast syndrome   his na has been slowly improving   his cr is slowly rising ? baseline cr   if cr cont to rise or bp lower will need IVF NS hydration   one liter fluid restriction for now  of note his cr i s also rising ? baseline  creatinine   cont losartan 100 mg daily

## 2023-06-10 LAB
ANION GAP SERPL CALC-SCNC: 11 MMOL/L — SIGNIFICANT CHANGE UP (ref 5–17)
BUN SERPL-MCNC: 22 MG/DL — SIGNIFICANT CHANGE UP (ref 7–23)
CALCIUM SERPL-MCNC: 9.2 MG/DL — SIGNIFICANT CHANGE UP (ref 8.4–10.5)
CHLORIDE SERPL-SCNC: 97 MMOL/L — SIGNIFICANT CHANGE UP (ref 96–108)
CO2 SERPL-SCNC: 23 MMOL/L — SIGNIFICANT CHANGE UP (ref 22–31)
CREAT SERPL-MCNC: 1.43 MG/DL — HIGH (ref 0.5–1.3)
EGFR: 53 ML/MIN/1.73M2 — LOW
GLUCOSE SERPL-MCNC: 86 MG/DL — SIGNIFICANT CHANGE UP (ref 70–99)
HCT VFR BLD CALC: 38 % — LOW (ref 39–50)
HGB BLD-MCNC: 12.8 G/DL — LOW (ref 13–17)
MCHC RBC-ENTMCNC: 32.3 PG — SIGNIFICANT CHANGE UP (ref 27–34)
MCHC RBC-ENTMCNC: 33.7 GM/DL — SIGNIFICANT CHANGE UP (ref 32–36)
MCV RBC AUTO: 96 FL — SIGNIFICANT CHANGE UP (ref 80–100)
NRBC # BLD: 0 /100 WBCS — SIGNIFICANT CHANGE UP (ref 0–0)
PLATELET # BLD AUTO: 166 K/UL — SIGNIFICANT CHANGE UP (ref 150–400)
POTASSIUM SERPL-MCNC: 4.4 MMOL/L — SIGNIFICANT CHANGE UP (ref 3.5–5.3)
POTASSIUM SERPL-SCNC: 4.4 MMOL/L — SIGNIFICANT CHANGE UP (ref 3.5–5.3)
RBC # BLD: 3.96 M/UL — LOW (ref 4.2–5.8)
RBC # FLD: 11.7 % — SIGNIFICANT CHANGE UP (ref 10.3–14.5)
SODIUM SERPL-SCNC: 131 MMOL/L — LOW (ref 135–145)
WBC # BLD: 9.28 K/UL — SIGNIFICANT CHANGE UP (ref 3.8–10.5)
WBC # FLD AUTO: 9.28 K/UL — SIGNIFICANT CHANGE UP (ref 3.8–10.5)

## 2023-06-10 PROCEDURE — 99233 SBSQ HOSP IP/OBS HIGH 50: CPT

## 2023-06-10 RX ORDER — SENNA PLUS 8.6 MG/1
2 TABLET ORAL AT BEDTIME
Refills: 0 | Status: DISCONTINUED | OUTPATIENT
Start: 2023-06-10 | End: 2023-06-14

## 2023-06-10 RX ORDER — POLYETHYLENE GLYCOL 3350 17 G/17G
17 POWDER, FOR SOLUTION ORAL DAILY
Refills: 0 | Status: DISCONTINUED | OUTPATIENT
Start: 2023-06-10 | End: 2023-06-14

## 2023-06-10 RX ADMIN — POLYETHYLENE GLYCOL 3350 17 GRAM(S): 17 POWDER, FOR SOLUTION ORAL at 12:06

## 2023-06-10 RX ADMIN — SENNA PLUS 2 TABLET(S): 8.6 TABLET ORAL at 21:05

## 2023-06-10 RX ADMIN — Medication 81 MILLIGRAM(S): at 11:30

## 2023-06-10 RX ADMIN — Medication 2000 UNIT(S): at 11:30

## 2023-06-10 RX ADMIN — ATORVASTATIN CALCIUM 40 MILLIGRAM(S): 80 TABLET, FILM COATED ORAL at 11:30

## 2023-06-10 RX ADMIN — LOSARTAN POTASSIUM 100 MILLIGRAM(S): 100 TABLET, FILM COATED ORAL at 05:16

## 2023-06-10 RX ADMIN — SODIUM CHLORIDE 50 MILLILITER(S): 9 INJECTION INTRAMUSCULAR; INTRAVENOUS; SUBCUTANEOUS at 12:05

## 2023-06-10 NOTE — DIETITIAN INITIAL EVALUATION ADULT - ADD RECOMMEND
1. Recommend continuing regular diet. Defer diet/texture modification to medical team/SLP as indicated   2. Consider adding  multivitamin supplement if no medical contraindications to aid in wound healing.   3. Encourage PO intakes. Honor food preferences as appropriate and available.   4. Monitor PO intake, GI tolerance, skin integrity and labs. RD remains available if needed, pt is aware.

## 2023-06-10 NOTE — PROGRESS NOTE ADULT - SUBJECTIVE AND OBJECTIVE BOX
no complaints.    GENERAL: No fevers, no chills.  EYES: No blurry vision,  No photophobia  ENT: No sore throat.  No dysphagia  Cardiovascular: No chest pain, palpitations, orthopnea  Pulmonary: No cough, no wheezing. No shortness of breath  Gastrointestinal: No abdominal pain, no diarrhea, no constipation.   Musculoskeletal: No weakness.  No myalgias.  Dermatology:  No rashes.  Neuro: No Headache.  No vertigo.  No dizziness.  Psych: No anxiety, no depression.  Denies suicidal thoughts.    MEDICATIONS  (STANDING):  aspirin enteric coated 81 milliGRAM(s) Oral daily  atorvastatin 40 milliGRAM(s) Oral daily  cholecalciferol 2000 Unit(s) Oral daily  losartan 100 milliGRAM(s) Oral daily  sodium chloride 0.9%. 1000 milliLiter(s) (50 mL/Hr) IV Continuous <Continuous>    MEDICATIONS  (PRN):  acetaminophen     Tablet .. 650 milliGRAM(s) Oral every 6 hours PRN Temp greater or equal to 38C (100.4F), Mild Pain (1 - 3)  melatonin 3 milliGRAM(s) Oral at bedtime PRN Insomnia    Vital Signs Last 24 Hrs  T(C): 36.7 (10 Vipin 2023 04:25), Max: 36.7 (10 Vipin 2023 04:25)  T(F): 98 (10 Vipin 2023 04:25), Max: 98 (10 Vipin 2023 04:25)  HR: 81 (10 Vipin 2023 04:25) (66 - 81)  BP: 154/80 (10 Vipin 2023 04:25) (128/70 - 159/90)  BP(mean): --  RR: 18 (10 Vipin 2023 04:25) (18 - 18)  SpO2: 98% (10 Vipin 2023 04:25) (96% - 98%)    Parameters below as of 10 Vipin 2023 04:25  Patient On (Oxygen Delivery Method): room air    GENERAL: NAD  HEAD:  Atraumatic, Normocephalic  EYES: EOMI, PERRLA, conjunctiva and sclera clear  ENT: Pharynx not erythematous  PULMONARY: Clear to auscultation bilaterally; No wheeze  CARDIOVASCULAR: Regular rate and rhythm; No murmurs, rubs, or gallops  ABDOMEN: Soft, Nontender, Nondistended; Bowel sounds present  EXTREMITIES:  2+ Peripheral Pulses, No clubbing, cyanosis, or edema  MUSCULOSKELETAL: No calf tenderness  PSYCH: AAOx3, normal affect  SKIN: warm and dry, No rashes or lesions    .  LABS:                         12.1   11.21 )-----------( 162      ( 2023 07:07 )             35.0         127<L>  |  92<L>  |  26<H>  ----------------------------<  93  4.2   |  22  |  1.47<H>    Ca    9.1      2023 18:02  Mg     1.8         TPro  6.3  /  Alb  3.9  /  TBili  2.9<H>  /  DBili  x   /  AST  17  /  ALT  14  /  AlkPhos  50      PT/INR - ( 2023 17:23 )   PT: 12.7 sec;   INR: 1.10 ratio         PTT - ( 2023 17:23 )  PTT:23.9 sec  Urinalysis Basic - ( 2023 19:17 )    Color: Light Yellow / Appearance: Clear / S.015 / pH: x  Gluc: x / Ketone: Negative  / Bili: Negative / Urobili: Negative   Blood: x / Protein: 30 mg/dL / Nitrite: Negative   Leuk Esterase: Negative / RBC: 0 /hpf / WBC 0 /HPF   Sq Epi: x / Non Sq Epi: x / Bacteria: Negative            RADIOLOGY, EKG & ADDITIONAL TESTS: Reviewed. .

## 2023-06-10 NOTE — DIETITIAN INITIAL EVALUATION ADULT - PERTINENT MEDS FT
MEDICATIONS  (STANDING):  aspirin enteric coated 81 milliGRAM(s) Oral daily  atorvastatin 40 milliGRAM(s) Oral daily  cholecalciferol 2000 Unit(s) Oral daily  losartan 100 milliGRAM(s) Oral daily  senna 2 Tablet(s) Oral at bedtime  sodium chloride 0.9%. 1000 milliLiter(s) (50 mL/Hr) IV Continuous <Continuous>    MEDICATIONS  (PRN):  acetaminophen     Tablet .. 650 milliGRAM(s) Oral every 6 hours PRN Temp greater or equal to 38C (100.4F), Mild Pain (1 - 3)  bisacodyl Suppository 10 milliGRAM(s) Rectal daily PRN Constipation  melatonin 3 milliGRAM(s) Oral at bedtime PRN Insomnia  polyethylene glycol 3350 17 Gram(s) Oral daily PRN Constipation

## 2023-06-10 NOTE — DIETITIAN INITIAL EVALUATION ADULT - OTHER CALCULATIONS
-- Defer Fluid needs to medical team  -- Estimated Calorie/Protein needs are based on current dosing weight of 160 pounds/ 72.6 kg.

## 2023-06-10 NOTE — DIETITIAN INITIAL EVALUATION ADULT - ENERGY INTAKE
Adequate (%) -- Pt reports excellent PO intakes and enjoying the meals he has received during present admission

## 2023-06-10 NOTE — DIETITIAN INITIAL EVALUATION ADULT - OTHER INFO
Weights:  --Drug Dosing Weight: 72.6 kg/ 160 pounds (6/9/23)  -- Pt reports his Recent UBW to be around 160 pounds  -- IBW: 148 pounds %IBW: 108%

## 2023-06-10 NOTE — DIETITIAN INITIAL EVALUATION ADULT - REASON FOR ADMISSION
Chart Reviewed, Events Noted  "68M w/ hx of cardiac myxoma? CAD s/p CABG to LAD 2013, HTN, HLD p/w acute encephalopathy in setting of leukocytosis and hyponatremia"

## 2023-06-10 NOTE — DIETITIAN INITIAL EVALUATION ADULT - PROBLEM SELECTOR PLAN 3
Maybe related to recent self disimpaction. No other clear signs of infection right now, UA and CXR w/o acute findings.  -Trend wbc  -Trend fever curve  -Will send procalcitonin  -Will watch off antibiotics for now  -Consider CT abd/pelvis if wbc not improving

## 2023-06-10 NOTE — DIETITIAN INITIAL EVALUATION ADULT - ORAL INTAKE PTA/DIET HISTORY
Pt confirms no known food allergies/intolerances. Reports having a fair appetite and PO intakes at home. Did not follow any specific dietary restrictions. Pt denies nausea, vomiting, diarrhea, or constipation. Denies difficulty chewing/swallowing. Reports taking Vitamin D3 at home.

## 2023-06-10 NOTE — DIETITIAN INITIAL EVALUATION ADULT - REASON
no overt signs of muscle/fat depletion upon visual observation; excellent PO intakes during present admission; nutrition focus physical examination not indicated at this time.

## 2023-06-10 NOTE — DIETITIAN INITIAL EVALUATION ADULT - NSFNSGIIOFT_GEN_A_CORE
-- Last bowel movement was on 6/9/23 ordered for senna, bisacodyl and polyethylene glycol for bowel regimen

## 2023-06-10 NOTE — DIETITIAN INITIAL EVALUATION ADULT - PERTINENT LABORATORY DATA
06-10    131<L>  |  97  |  22  ----------------------------<  86  4.4   |  23  |  1.43<H>    Ca    9.2      10 Vipin 2023 06:46  Mg     1.8     06-09    TPro  6.3  /  Alb  3.9  /  TBili  2.9<H>  /  DBili  x   /  AST  17  /  ALT  14  /  AlkPhos  50  06-09

## 2023-06-11 LAB
ANION GAP SERPL CALC-SCNC: 13 MMOL/L — SIGNIFICANT CHANGE UP (ref 5–17)
BUN SERPL-MCNC: 23 MG/DL — SIGNIFICANT CHANGE UP (ref 7–23)
CALCIUM SERPL-MCNC: 9.2 MG/DL — SIGNIFICANT CHANGE UP (ref 8.4–10.5)
CHLORIDE SERPL-SCNC: 99 MMOL/L — SIGNIFICANT CHANGE UP (ref 96–108)
CO2 SERPL-SCNC: 22 MMOL/L — SIGNIFICANT CHANGE UP (ref 22–31)
CREAT SERPL-MCNC: 1.51 MG/DL — HIGH (ref 0.5–1.3)
EGFR: 50 ML/MIN/1.73M2 — LOW
GLUCOSE SERPL-MCNC: 97 MG/DL — SIGNIFICANT CHANGE UP (ref 70–99)
HCT VFR BLD CALC: 36.1 % — LOW (ref 39–50)
HGB BLD-MCNC: 12.1 G/DL — LOW (ref 13–17)
MAGNESIUM SERPL-MCNC: 1.9 MG/DL — SIGNIFICANT CHANGE UP (ref 1.6–2.6)
MCHC RBC-ENTMCNC: 32.6 PG — SIGNIFICANT CHANGE UP (ref 27–34)
MCHC RBC-ENTMCNC: 33.5 GM/DL — SIGNIFICANT CHANGE UP (ref 32–36)
MCV RBC AUTO: 97.3 FL — SIGNIFICANT CHANGE UP (ref 80–100)
NRBC # BLD: 0 /100 WBCS — SIGNIFICANT CHANGE UP (ref 0–0)
PHOSPHATE SERPL-MCNC: 3.5 MG/DL — SIGNIFICANT CHANGE UP (ref 2.5–4.5)
PLATELET # BLD AUTO: 174 K/UL — SIGNIFICANT CHANGE UP (ref 150–400)
POTASSIUM SERPL-MCNC: 4.7 MMOL/L — SIGNIFICANT CHANGE UP (ref 3.5–5.3)
POTASSIUM SERPL-SCNC: 4.7 MMOL/L — SIGNIFICANT CHANGE UP (ref 3.5–5.3)
RBC # BLD: 3.71 M/UL — LOW (ref 4.2–5.8)
RBC # FLD: 11.9 % — SIGNIFICANT CHANGE UP (ref 10.3–14.5)
SODIUM SERPL-SCNC: 134 MMOL/L — LOW (ref 135–145)
WBC # BLD: 8.1 K/UL — SIGNIFICANT CHANGE UP (ref 3.8–10.5)
WBC # FLD AUTO: 8.1 K/UL — SIGNIFICANT CHANGE UP (ref 3.8–10.5)

## 2023-06-11 PROCEDURE — 99232 SBSQ HOSP IP/OBS MODERATE 35: CPT

## 2023-06-11 RX ORDER — HYDRALAZINE HCL 50 MG
10 TABLET ORAL ONCE
Refills: 0 | Status: DISCONTINUED | OUTPATIENT
Start: 2023-06-11 | End: 2023-06-11

## 2023-06-11 RX ADMIN — ATORVASTATIN CALCIUM 40 MILLIGRAM(S): 80 TABLET, FILM COATED ORAL at 12:07

## 2023-06-11 RX ADMIN — Medication 1 TABLET(S): at 12:07

## 2023-06-11 RX ADMIN — LOSARTAN POTASSIUM 100 MILLIGRAM(S): 100 TABLET, FILM COATED ORAL at 05:19

## 2023-06-11 RX ADMIN — Medication 81 MILLIGRAM(S): at 12:07

## 2023-06-11 RX ADMIN — Medication 2000 UNIT(S): at 12:06

## 2023-06-11 NOTE — PHYSICAL THERAPY INITIAL EVALUATION ADULT - ADDITIONAL COMMENTS
Pt lives alone. PTA pt was independent with ADLs and ambulation. Pt lives alone in an apartment with elevator access, however pt prefers to take the stairs (1 flight to apartment.) PTA pt was independent with ADLs and ambulation.

## 2023-06-11 NOTE — PROGRESS NOTE ADULT - SUBJECTIVE AND OBJECTIVE BOX
no complaints.    GENERAL: No fevers, no chills.  EYES: No blurry vision,  No photophobia  ENT: No sore throat.  No dysphagia  Cardiovascular: No chest pain, palpitations, orthopnea  Pulmonary: No cough, no wheezing. No shortness of breath  Gastrointestinal: No abdominal pain, no diarrhea, no constipation.   Musculoskeletal: No weakness.  No myalgias.  Dermatology:  No rashes.  Neuro: No Headache.  No vertigo.  No dizziness.  Psych: No anxiety, no depression.  Denies suicidal thoughts.    MEDICATIONS  (STANDING):  aspirin enteric coated 81 milliGRAM(s) Oral daily  atorvastatin 40 milliGRAM(s) Oral daily  cholecalciferol 2000 Unit(s) Oral daily  losartan 100 milliGRAM(s) Oral daily  multivitamin 1 Tablet(s) Oral daily  senna 2 Tablet(s) Oral at bedtime    MEDICATIONS  (PRN):  acetaminophen     Tablet .. 650 milliGRAM(s) Oral every 6 hours PRN Temp greater or equal to 38C (100.4F), Mild Pain (1 - 3)  bisacodyl Suppository 10 milliGRAM(s) Rectal daily PRN Constipation  melatonin 3 milliGRAM(s) Oral at bedtime PRN Insomnia  polyethylene glycol 3350 17 Gram(s) Oral daily PRN Constipation    Vital Signs Last 24 Hrs  T(C): 36.9 (11 Jun 2023 05:28), Max: 36.9 (11 Jun 2023 05:28)  T(F): 98.5 (11 Jun 2023 05:28), Max: 98.5 (11 Jun 2023 05:28)  HR: 64 (11 Jun 2023 05:28) (64 - 102)  BP: 174/99 (11 Jun 2023 05:28) (123/55 - 174/99)  BP(mean): --  RR: 18 (11 Jun 2023 05:28) (18 - 18)  SpO2: 96% (11 Jun 2023 05:28) (95% - 98%)    Parameters below as of 11 Jun 2023 05:28  Patient On (Oxygen Delivery Method): room air    GENERAL: NAD  HEAD:  Atraumatic, Normocephalic  EYES: EOMI, PERRLA, conjunctiva and sclera clear  ENT: Pharynx not erythematous  PULMONARY: Clear to auscultation bilaterally; No wheeze  CARDIOVASCULAR: Regular rate and rhythm; No murmurs, rubs, or gallops  ABDOMEN: Soft, Nontender, Nondistended; Bowel sounds present  EXTREMITIES:  2+ Peripheral Pulses, No clubbing, cyanosis, or edema  MUSCULOSKELETAL: No calf tenderness  PSYCH: AAOx3, normal affect  SKIN: warm and dry, No rashes or lesions    .  LABS:                         12.1   8.10  )-----------( 174      ( 11 Jun 2023 06:52 )             36.1     06-11    134<L>  |  99  |  23  ----------------------------<  97  4.7   |  22  |  1.51<H>    Ca    9.2      11 Jun 2023 06:52  Phos  3.5     06-11  Mg     1.9     06-11                RADIOLOGY, EKG & ADDITIONAL TESTS: Reviewed.

## 2023-06-11 NOTE — PROGRESS NOTE ADULT - SUBJECTIVE AND OBJECTIVE BOX
[facility]   [phone]  RENAL FOLLOW UP NOTE  --------------------------------------------------------------------------------  Chief Complaint:    24 hour events/subjective:    seen earlier   states had rectal bleed [ of note pt had self impacted himself at home PTA ]    PAST HISTORY  --------------------------------------------------------------------------------  No significant changes to PMH, PSH, FHx, SHx, unless otherwise noted    ALLERGIES & MEDICATIONS  --------------------------------------------------------------------------------  Allergies    erythromycin (Unknown)    Intolerances      Standing Inpatient Medications  aspirin enteric coated 81 milliGRAM(s) Oral daily  atorvastatin 40 milliGRAM(s) Oral daily  cholecalciferol 2000 Unit(s) Oral daily  losartan 100 milliGRAM(s) Oral daily  multivitamin 1 Tablet(s) Oral daily  senna 2 Tablet(s) Oral at bedtime    PRN Inpatient Medications  acetaminophen     Tablet .. 650 milliGRAM(s) Oral every 6 hours PRN  bisacodyl Suppository 10 milliGRAM(s) Rectal daily PRN  melatonin 3 milliGRAM(s) Oral at bedtime PRN  polyethylene glycol 3350 17 Gram(s) Oral daily PRN      REVIEW OF SYSTEMS  --------------------------------------------------------------------------------    Gen: denies  fevers/chills,  CVS: denies chest pain/palpitations  Resp: denies SOB/Cough  GI: Denies N/V/Abd pain  : Denies dysuria/oliguria/hematuria      VITALS/PHYSICAL EXAM  --------------------------------------------------------------------------------  T(C): 36.4 (06-11-23 @ 20:15), Max: 36.9 (06-11-23 @ 05:28)  HR: 74 (06-11-23 @ 20:15) (64 - 102)  BP: 161/91 (06-11-23 @ 16:16) (123/55 - 174/99)  RR: 18 (06-11-23 @ 20:15) (18 - 19)  SpO2: 97% (06-11-23 @ 20:15) (96% - 98%)  Wt(kg): --        06-10-23 @ 07:01  -  06-11-23 @ 07:00  --------------------------------------------------------  IN: 660 mL / OUT: 0 mL / NET: 660 mL      Physical Exam:  	    Gen: Non toxic comfortable appearing    	no jvd  	Pulm: decrease bs  no rales or ronchi or wheezing  	CV: RRR, S1S2; no rub  	Abd: +BS, soft, nontender/nondistended  	: No suprapubic tenderness  	UE: Warm, no cyanosis  no clubbing,  no edema   	LE: Warm, no cyanosis  no clubbing, no edema  	Neuro: alert and oriented. speech coherent       LABS/STUDIES  --------------------------------------------------------------------------------              12.1   8.10  >-----------<  174      [06-11-23 @ 06:52]              36.1     134  |  99  |  23  ----------------------------<  97      [06-11-23 @ 06:52]  4.7   |  22  |  1.51        Ca     9.2     [06-11-23 @ 06:52]      Mg     1.9     [06-11-23 @ 06:52]      Phos  3.5     [06-11-23 @ 06:52]            Creatinine Trend:  SCr 1.51 [06-11 @ 06:52]  SCr 1.43 [06-10 @ 06:46]  SCr 1.47 [06-09 @ 18:02]  SCr 1.25 [06-09 @ 07:06]  SCr 1.28 [06-08 @ 17:23]              Urinalysis - [06-08-23 @ 19:17]      Color Light Yellow / Appearance Clear / SG 1.015 / pH 7.0      Gluc Negative / Ketone Negative  / Bili Negative / Urobili Negative       Blood Negative / Protein 30 mg/dL / Leuk Est Negative / Nitrite Negative      RBC 0 / WBC 0 / Hyaline 0 / Gran  / Sq Epi  / Non Sq Epi  / Bacteria Negative    Urine Creatinine 32      [06-08-23 @ 19:17]  Urine Protein 36      [06-08-23 @ 19:17]  Urine Sodium 60      [06-08-23 @ 19:17]  Urine Urea Nitrogen 303      [06-08-23 @ 19:17]  Urine Potassium 26      [06-08-23 @ 19:17]  Urine Osmolality 300      [06-08-23 @ 19:17]    TSH 1.18      [06-09-23 @ 07:11]

## 2023-06-11 NOTE — PHYSICAL THERAPY INITIAL EVALUATION ADULT - PERTINENT HX OF CURRENT PROBLEM, REHAB EVAL
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 CAD s/p CABG to LAD 2013, HTN, HLD p/w AMS. Pt splits time between [place] and [place]. Has been in [place] for between 1-2 months while his wife has been in [place]. Has been in contact with wife. Today, he was telling wife that he has been constipated for past 2-3 days. Unable to have BM. Wife suggested pt try to disimpact himself. Pt reports drinking lots of water during day and straining on toilet. Cannot specify how much. Did not take laxatives or meds. Ended up trying to disimpact himself in the afternoon and was successful afterwards. Called wife back around 4pm and wife felt he was very confused and did not make sense. Had friend come evaluate him and EMS was called to bring patient to hospital.

## 2023-06-12 ENCOUNTER — TRANSCRIPTION ENCOUNTER (OUTPATIENT)
Age: 68
End: 2023-06-12

## 2023-06-12 DIAGNOSIS — N17.9 ACUTE KIDNEY FAILURE, UNSPECIFIED: ICD-10-CM

## 2023-06-12 LAB
ANION GAP SERPL CALC-SCNC: 15 MMOL/L — SIGNIFICANT CHANGE UP (ref 5–17)
BUN SERPL-MCNC: 24 MG/DL — HIGH (ref 7–23)
CALCIUM SERPL-MCNC: 9.5 MG/DL — SIGNIFICANT CHANGE UP (ref 8.4–10.5)
CHLORIDE SERPL-SCNC: 99 MMOL/L — SIGNIFICANT CHANGE UP (ref 96–108)
CO2 SERPL-SCNC: 21 MMOL/L — LOW (ref 22–31)
CREAT SERPL-MCNC: 1.5 MG/DL — HIGH (ref 0.5–1.3)
EGFR: 50 ML/MIN/1.73M2 — LOW
GLUCOSE SERPL-MCNC: 86 MG/DL — SIGNIFICANT CHANGE UP (ref 70–99)
POTASSIUM SERPL-MCNC: 4.2 MMOL/L — SIGNIFICANT CHANGE UP (ref 3.5–5.3)
POTASSIUM SERPL-SCNC: 4.2 MMOL/L — SIGNIFICANT CHANGE UP (ref 3.5–5.3)
SODIUM SERPL-SCNC: 135 MMOL/L — SIGNIFICANT CHANGE UP (ref 135–145)

## 2023-06-12 PROCEDURE — 99239 HOSP IP/OBS DSCHRG MGMT >30: CPT

## 2023-06-12 RX ORDER — SENNA PLUS 8.6 MG/1
2 TABLET ORAL
Qty: 0 | Refills: 0 | DISCHARGE
Start: 2023-06-12

## 2023-06-12 RX ORDER — SODIUM CHLORIDE 9 MG/ML
1000 INJECTION, SOLUTION INTRAVENOUS
Refills: 0 | Status: DISCONTINUED | OUTPATIENT
Start: 2023-06-12 | End: 2023-06-14

## 2023-06-12 RX ORDER — LABETALOL HCL 100 MG
10 TABLET ORAL ONCE
Refills: 0 | Status: DISCONTINUED | OUTPATIENT
Start: 2023-06-12 | End: 2023-06-12

## 2023-06-12 RX ORDER — POLYETHYLENE GLYCOL 3350 17 G/17G
17 POWDER, FOR SOLUTION ORAL
Qty: 0 | Refills: 0 | DISCHARGE
Start: 2023-06-12

## 2023-06-12 RX ORDER — LOSARTAN POTASSIUM 100 MG/1
1 TABLET, FILM COATED ORAL
Qty: 0 | Refills: 0 | DISCHARGE
Start: 2023-06-12

## 2023-06-12 RX ORDER — HYDRALAZINE HCL 50 MG
10 TABLET ORAL ONCE
Refills: 0 | Status: COMPLETED | OUTPATIENT
Start: 2023-06-12 | End: 2023-06-12

## 2023-06-12 RX ADMIN — Medication 81 MILLIGRAM(S): at 11:29

## 2023-06-12 RX ADMIN — ATORVASTATIN CALCIUM 40 MILLIGRAM(S): 80 TABLET, FILM COATED ORAL at 11:28

## 2023-06-12 RX ADMIN — Medication 2000 UNIT(S): at 11:28

## 2023-06-12 RX ADMIN — Medication 1 TABLET(S): at 11:29

## 2023-06-12 RX ADMIN — SODIUM CHLORIDE 75 MILLILITER(S): 9 INJECTION, SOLUTION INTRAVENOUS at 17:26

## 2023-06-12 RX ADMIN — LOSARTAN POTASSIUM 100 MILLIGRAM(S): 100 TABLET, FILM COATED ORAL at 05:07

## 2023-06-12 RX ADMIN — Medication 10 MILLIGRAM(S): at 01:02

## 2023-06-12 RX ADMIN — SENNA PLUS 2 TABLET(S): 8.6 TABLET ORAL at 21:50

## 2023-06-12 NOTE — PROGRESS NOTE ADULT - SUBJECTIVE AND OBJECTIVE BOX
[facility]   [phone]  RENAL FOLLOW UP NOTE  --------------------------------------------------------------------------------  Chief Complaint:    24 hour events/subjective:    seen earlier   states feels better   urinating well     PAST HISTORY  --------------------------------------------------------------------------------  No significant changes to PMH, PSH, FHx, SHx, unless otherwise noted    ALLERGIES & MEDICATIONS  --------------------------------------------------------------------------------  Allergies    erythromycin (Unknown)    Intolerances      Standing Inpatient Medications  aspirin enteric coated 81 milliGRAM(s) Oral daily  atorvastatin 40 milliGRAM(s) Oral daily  cholecalciferol 2000 Unit(s) Oral daily  lactated ringers. 1000 milliLiter(s) IV Continuous <Continuous>  losartan 100 milliGRAM(s) Oral daily  multivitamin 1 Tablet(s) Oral daily  senna 2 Tablet(s) Oral at bedtime    PRN Inpatient Medications  acetaminophen     Tablet .. 650 milliGRAM(s) Oral every 6 hours PRN  bisacodyl Suppository 10 milliGRAM(s) Rectal daily PRN  melatonin 3 milliGRAM(s) Oral at bedtime PRN  polyethylene glycol 3350 17 Gram(s) Oral daily PRN      REVIEW OF SYSTEMS  --------------------------------------------------------------------------------    Gen: denies  fevers/chills,  CVS: denies chest pain/palpitations  Resp: denies SOB/Cough  GI: Denies N/V/Abd pain  : Denies dysuria      VITALS/PHYSICAL EXAM  --------------------------------------------------------------------------------  T(C): 36.4 (06-12-23 @ 12:02), Max: 37 (06-12-23 @ 01:30)  HR: 77 (06-12-23 @ 13:31) (66 - 78)  BP: 159/91 (06-12-23 @ 13:31) (156/78 - 184/96)  RR: 18 (06-12-23 @ 13:31) (18 - 18)  SpO2: 98% (06-12-23 @ 13:31) (96% - 98%)  Wt(kg): --        06-11-23 @ 07:01  -  06-12-23 @ 07:00  --------------------------------------------------------  IN: 365 mL / OUT: 0 mL / NET: 365 mL      Physical Exam:  	    Gen: Non toxic comfortable appearing    	no jvd  	Pulm: decrease bs  no rales or ronchi or wheezing  	CV: RRR, S1S2; no rub  	Abd: +BS, soft, nontender/nondistended  	: No suprapubic tenderness  	UE: Warm, no cyanosis  no clubbing,  no edema   	LE: Warm, no cyanosis  no clubbing, no edema  	Neuro: alert and oriented. speech coherent     LABS/STUDIES  --------------------------------------------------------------------------------              12.1   8.10  >-----------<  174      [06-11-23 @ 06:52]              36.1     135  |  99  |  24  ----------------------------<  86      [06-12-23 @ 07:12]  4.2   |  21  |  1.50        Ca     9.5     [06-12-23 @ 07:12]      Mg     1.9     [06-11-23 @ 06:52]      Phos  3.5     [06-11-23 @ 06:52]            Creatinine Trend:  SCr 1.50 [06-12 @ 07:12]  SCr 1.51 [06-11 @ 06:52]  SCr 1.43 [06-10 @ 06:46]  SCr 1.47 [06-09 @ 18:02]  SCr 1.25 [06-09 @ 07:06]              Urinalysis - [06-08-23 @ 19:17]      Color Light Yellow / Appearance Clear / SG 1.015 / pH 7.0      Gluc Negative / Ketone Negative  / Bili Negative / Urobili Negative       Blood Negative / Protein 30 mg/dL / Leuk Est Negative / Nitrite Negative      RBC 0 / WBC 0 / Hyaline 0 / Gran  / Sq Epi  / Non Sq Epi  / Bacteria Negative    Urine Creatinine 32      [06-08-23 @ 19:17]  Urine Protein 36      [06-08-23 @ 19:17]  Urine Sodium 60      [06-08-23 @ 19:17]  Urine Urea Nitrogen 303      [06-08-23 @ 19:17]  Urine Potassium 26      [06-08-23 @ 19:17]  Urine Osmolality 300      [06-08-23 @ 19:17]    TSH 1.18      [06-09-23 @ 07:11]      < from: CT Angio Brain Stroke Protocol  w/ IV Cont (06.08.23 @ 17:49) >  ACC: [id number] EXAM:  CT BRAIN PERFUSION MAPS STROKE   ORDERED BY:   LESLIE MEJIA     ACC: [id number] EXAM:  CT ANGIO BRAIN STROKE PROTC IC   ORDERED BY:   LESLIE MEJIA     ACC: [id number] EXAM:  CT ANGIO NECK STROKE PROTCL IC   ORDERED BY:   LESLIE JACKIE     PROCEDURE DATE:  06/08/2023          INTERPRETATION:  CLINICAL HISTORY: Confusion, word finding difficulty,   question stroke.    TECHNIQUE:  CT images were acquired through the  neck and head  during the arterial   phase.  Intravenous contrast:  70 cc of Omnipaque-300 mg/ml were administered; 30   cc were discarded.  Three-dimensional MIP reformats were generated.  During IV contrast administration, serial thin section CT images of the   brain were obtained for CT perfusion.The raw data was sent to Rapid   Ischemia view for post processing.    COMPARISON STUDY: Same day noncontrast CT head.    FINDINGS:  CT ANGIOGRAPHY NECK:  Thoracic aorta and branch vessels: Calcifications of the aortic arch   without flow-limiting stenosis. No evidence of dissection.  Right carotid system: Calcified and noncalcified plaque at the carotid   bifurcation resulting in approximately 20% stenosis using NASCET   criteria. No evidence of dissection.  Left carotid system: Calcified and noncalcified plaque at the carotid   bifurcation resulting no hemodynamically significant stenosis using   NASCET criteria.  No evidence of dissection.  Vertebral arteries: Calcified plaque at the origin of the right vertebral   artery resulting in mildstenosis. No evidence of dissection. Right   dominant system.  Soft tissues of the neck: Unremarkable.  Visualized spine: Multilevel degenerative change.  Visualized upper chest: Unremarkable.    CT ANGIOGRAPHY BRAIN:  Internal carotid arteries: Calcifications of the cavernous segments   bilaterally resulting in mild stenosis.  Anterior cerebral arteries: Patent bilaterally without flow limiting   stenosis.  Middle cerebral arteries: Patent bilaterally without flow limiting    stenosis.    Posterior cerebral arteries: Patent bilaterally without stenosis.  Vertebrobasilar: Patent without stenosis. Left vertebral artery   terminates in PICA.  Dural venous sinuses: Grossly patent.    CT PERFUSION:    < end of copied text >

## 2023-06-12 NOTE — DISCHARGE NOTE PROVIDER - ATTENDING DISCHARGE PHYSICAL EXAMINATION:
AAOx4 answering all questions appropriately  CTA induced LUIS now stablalized will resolve just needs outpt f/u within 2 weeks for this  HTN normally well controlled, running a little high here on Losartan 100mg. PMD is cardiologist, needs f/u appt for BP check and medication adjustment if necessary (vs. white coat HTN). Stroke w/u negative.

## 2023-06-12 NOTE — DISCHARGE NOTE PROVIDER - NSDCCPCAREPLAN_GEN_ALL_CORE_FT
PRINCIPAL DISCHARGE DIAGNOSIS  Diagnosis: Hyponatremia  Assessment and Plan of Treatment: Resolved  Avoid excessive water intake, drink only to thirst      SECONDARY DISCHARGE DIAGNOSES  Diagnosis: Essential hypertension  Assessment and Plan of Treatment: Continue Losartan and follow up with PMD or Cardiologist for Blood Pressure Monitoring and medication adjustements as needed    Diagnosis: Altered mental status  Assessment and Plan of Treatment: Resolved, likely related to hyponatremia    Diagnosis: LUIS (acute kidney injury)  Assessment and Plan of Treatment: Kidney function is stable but needs ongoing monitoring. Follow up with your PMD within 2 weeks for lab draw and createnine check.     PRINCIPAL DISCHARGE DIAGNOSIS  Diagnosis: Hyponatremia  Assessment and Plan of Treatment: Resolved  Avoid excessive water intake, drink only to thirst      SECONDARY DISCHARGE DIAGNOSES  Diagnosis: Altered mental status  Assessment and Plan of Treatment: Resolved, likely related to hyponatremia    Diagnosis: Essential hypertension  Assessment and Plan of Treatment: Continue Losartan as prescribed and follow up with PMD or Cardiologist for Blood Pressure Monitoring and medication adjustements as needed    Diagnosis: LUIS (acute kidney injury)  Assessment and Plan of Treatment: Kidney function is stable but needs ongoing monitoring. Follow up with your PMD within 2 weeks for lab draw and createnine check.

## 2023-06-12 NOTE — PROGRESS NOTE ADULT - SUBJECTIVE AND OBJECTIVE BOX
PROGRESS NOTE:   Carol Sanchez DO  Hospitalist  Pager [phone]  After 5pm/weekends or if no answer ext: [phone]      Patient is a 68y old  Male who presents with a chief complaint of Acute encephalopathy (12 Jun 2023 14:17)      SUBJECTIVE / OVERNIGHT EVENTS: EVIN feeling well    ADDITIONAL REVIEW OF SYSTEMS:  no fever or chills no n/v/d    MEDICATIONS  (STANDING):  aspirin enteric coated 81 milliGRAM(s) Oral daily  atorvastatin 40 milliGRAM(s) Oral daily  cholecalciferol 2000 Unit(s) Oral daily  losartan 100 milliGRAM(s) Oral daily  multivitamin 1 Tablet(s) Oral daily  senna 2 Tablet(s) Oral at bedtime    MEDICATIONS  (PRN):  acetaminophen     Tablet .. 650 milliGRAM(s) Oral every 6 hours PRN Temp greater or equal to 38C (100.4F), Mild Pain (1 - 3)  bisacodyl Suppository 10 milliGRAM(s) Rectal daily PRN Constipation  melatonin 3 milliGRAM(s) Oral at bedtime PRN Insomnia  polyethylene glycol 3350 17 Gram(s) Oral daily PRN Constipation      CAPILLARY BLOOD GLUCOSE        I&O's Summary    11 Jun 2023 07:01  -  12 Jun 2023 07:00  --------------------------------------------------------  IN: 365 mL / OUT: 0 mL / NET: 365 mL        PHYSICAL EXAM:  Vital Signs Last 24 Hrs  T(C): 36.4 (12 Jun 2023 12:02), Max: 37 (12 Jun 2023 01:30)  T(F): 97.5 (12 Jun 2023 12:02), Max: 98.6 (12 Jun 2023 01:30)  HR: 77 (12 Jun 2023 13:31) (66 - 79)  BP: 159/91 (12 Jun 2023 13:31) (156/78 - 184/96)  BP(mean): --  RR: 18 (12 Jun 2023 13:31) (18 - 19)  SpO2: 98% (12 Jun 2023 13:31) (96% - 98%)    Parameters below as of 12 Jun 2023 13:31  Patient On (Oxygen Delivery Method): room air        CONSTITUTIONAL: NAD, well-developed, non toxic  RESPIRATORY: Normal respiratory effort; lungs are clear to auscultation bilaterally  CARDIOVASCULAR: Regular rate and rhythm, normal S1 and S2, no murmur/rub/gallop; No lower extremity edema; Peripheral pulses are 2+ bilaterally  ABDOMEN: Nontender to palpation, normoactive bowel sounds, no rebound/guarding; No hepatosplenomegaly  MUSCLOSKELETAL: no clubbing or cyanosis of digits; no joint swelling or tenderness to palpation  PSYCH: A+O to person, place, and time; affect appropriate    LABS:                        12.1   8.10  )-----------( 174      ( 11 Jun 2023 06:52 )             36.1     06-12    135  |  99  |  24<H>  ----------------------------<  86  4.2   |  21<L>  |  1.50<H>    Ca    9.5      12 Jun 2023 07:12  Phos  3.5     06-11  Mg     1.9     06-11                  RADIOLOGY & ADDITIONAL TESTS:  Results Reviewed:   Imaging Personally Reviewed:  Electrocardiogram Personally Reviewed:    COORDINATION OF CARE:  Care Discussed with Consultants/Other Providers [Y/N]:  Prior or Outpatient Records Reviewed [Y/N]:

## 2023-06-12 NOTE — DISCHARGE NOTE PROVIDER - NSDCFUADDAPPT_GEN_ALL_CORE_FT
APPTS ARE READY TO BE MADE: [x] YES    Best Family or Patient Contact (if needed):    Additional Information about above appointments (if needed):    1:   2:   3:     Other comments or requests:    APPTS ARE READY TO BE MADE: [x] YES    Best Family or Patient Contact (if needed):    Additional Information about above appointments (if needed):    1: Dr. Francis 1 week  2:   3:     Other comments or requests:    APPTS ARE READY TO BE MADE: [x] YES    Best Family or Patient Contact (if needed):    Additional Information about above appointments (if needed):    1: Dr. Francis 1 week  2:   3:     Other comments or requests:     3 attempts were made to reach patient, which have been unsuccessful. 3 Voicemails have been left on 6/15, 6/16, and 6/19. Will await a call back from patient to coordinate follow up care.

## 2023-06-12 NOTE — PROVIDER CONTACT NOTE (CHANGE IN STATUS NOTIFICATION) - BACKGROUND
Patient was admitted for hyponatremia and has a history of hypertension. Losartan was given at 5:00 in the morning.

## 2023-06-12 NOTE — DISCHARGE NOTE PROVIDER - HOSPITAL COURSE
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 CAD s/p CABG to LAD 2013, HTN, HLD p/w AMS. Pt splits time between [place] and [place]. Has been in [place] for between 1-2 months while his wife has been in [place]. Has been in contact with wife. Today, he was telling wife that he has been constipated for past 2-3 days. Unable to have BM. Wife suggested pt try to disimpact himself. Pt reports drinking lots of water during day and straining on toilet. Cannot specify how much. Did not take laxatives or meds. Ended up trying to disimpact himself in the afternoon and was successful afterwards. Called wife back around 4pm and wife felt he was very confused and did not make sense. Had friend come evaluate him and EMS was called to bring patient to hospital. Pt denies any fevers or chills. Unable to provide accurate ROS but does endorse similar timeline of events during day. Found to have hyponatremia to 121.  Stroke ruled out on admission with CTAs brain, sroke protocol, head and neck.  He but resulted in slight increase in Cr to 1.5, Stable now downtrending needs outpatient follow up for this.  Also has been intermittently hypertensive in hospital but PMD is cardiologist,  may have white coat hypertension so should f/u with cardiologist within 2 weeks for BP check and medication adjustment as needed. SBP in house 140s-170s max 180s. Asymptomatic and no stroke on CT head. 68 year old male w/ hx of cardiac myxoma? CAD s/p CABG to LAD 2013, HTN, HLD p/w AMS. Pt splits time between [place] and [place]. Has been in [place] for between 1-2 months while his wife has been in [place]. Has been in contact with wife - reported he was telling wife that he has been constipated for past 2-3 days. Unable to have BM. Wife suggested pt try to disimpact himself. Pt reports drinking lots of water during day and straining on toilet. Cannot specify how much. Did not take laxatives or meds. Ended up trying to disimpact himself in the afternoon and was successful afterwards. Called wife back around 4pm and wife felt he was very confused and did not make sense. Had friend come evaluate him and EMS was called to bring patient to hospital. Pt denies any fevers or chills. Unable to provide accurate ROS but does endorse similar timeline of events during day. Found to have hyponatremia to 121.  Stroke ruled out on admission with CTAs brain, sroke protocol, head and neck. His labs resulted in slight increase in Cr to 1.5, Stable now downtrending needs outpatient follow up for this.  Also has been intermittently hypertensive in hospital but PMD is cardiologist,  may have white coat hypertension so should f/u with cardiologist within 2 weeks for BP check and medication adjustment as needed. SBP in house 140s-170s max 200s. Asymptomatic and no stroke on CT head.

## 2023-06-12 NOTE — DISCHARGE NOTE PROVIDER - NSDCMRMEDTOKEN_GEN_ALL_CORE_FT
aspirin 81 mg oral tablet: 1 orally once a day  atorvastatin 40 mg oral tablet: 1 orally once a day  losartan 100 mg oral tablet: 1 tab(s) orally once a day  Multiple Vitamins oral tablet: 1 tab(s) orally once a day  polyethylene glycol 3350 oral powder for reconstitution: 17 gram(s) orally once a day As needed Constipation  senna leaf extract oral tablet: 2 tab(s) orally once a day (at bedtime)  Vitamin D3 50 mcg (2000 intl units) oral tablet: 1 orally once a day   aspirin 81 mg oral tablet: 1 orally once a day  atorvastatin 40 mg oral tablet: 1 orally once a day  losartan 100 mg oral tablet: 1 tab(s) orally once a day  Multiple Vitamins oral tablet: 1 tab(s) orally once a day  NIFEdipine 30 mg oral tablet, extended release: 1 tab(s) orally once a day  polyethylene glycol 3350 oral powder for reconstitution: 17 gram(s) orally once a day As needed Constipation  senna leaf extract oral tablet: 2 tab(s) orally once a day (at bedtime)  Vitamin D3 50 mcg (2000 intl units) oral tablet: 1 orally once a day

## 2023-06-13 ENCOUNTER — TRANSCRIPTION ENCOUNTER (OUTPATIENT)
Age: 68
End: 2023-06-13

## 2023-06-13 LAB
CREAT SERPL-MCNC: 1.32 MG/DL — HIGH (ref 0.5–1.3)
EGFR: 59 ML/MIN/1.73M2 — LOW
SARS-COV-2 RNA SPEC QL NAA+PROBE: SIGNIFICANT CHANGE UP

## 2023-06-13 PROCEDURE — 99232 SBSQ HOSP IP/OBS MODERATE 35: CPT

## 2023-06-13 RX ORDER — NIFEDIPINE 30 MG
1 TABLET, EXTENDED RELEASE 24 HR ORAL
Qty: 30 | Refills: 0
Start: 2023-06-13 | End: 2023-07-12

## 2023-06-13 RX ORDER — HYDRALAZINE HCL 50 MG
5 TABLET ORAL ONCE
Refills: 0 | Status: COMPLETED | OUTPATIENT
Start: 2023-06-13 | End: 2023-06-13

## 2023-06-13 RX ORDER — NIFEDIPINE 30 MG
30 TABLET, EXTENDED RELEASE 24 HR ORAL DAILY
Refills: 0 | Status: DISCONTINUED | OUTPATIENT
Start: 2023-06-13 | End: 2023-06-14

## 2023-06-13 RX ADMIN — Medication 30 MILLIGRAM(S): at 14:45

## 2023-06-13 RX ADMIN — Medication 5 MILLIGRAM(S): at 01:45

## 2023-06-13 RX ADMIN — Medication 1 TABLET(S): at 11:47

## 2023-06-13 RX ADMIN — ATORVASTATIN CALCIUM 40 MILLIGRAM(S): 80 TABLET, FILM COATED ORAL at 11:46

## 2023-06-13 RX ADMIN — Medication 5 MILLIGRAM(S): at 00:56

## 2023-06-13 RX ADMIN — Medication 81 MILLIGRAM(S): at 11:46

## 2023-06-13 RX ADMIN — Medication 2000 UNIT(S): at 11:47

## 2023-06-13 RX ADMIN — LOSARTAN POTASSIUM 100 MILLIGRAM(S): 100 TABLET, FILM COATED ORAL at 00:33

## 2023-06-13 NOTE — PROGRESS NOTE ADULT - PROBLEM SELECTOR PLAN 1
- likely acute hyponatremia
- resolved, s/p IVF  - dr bar following-- uptick in cr noted, dr bar messaged  - PT pending
- resolved, s/p IVF and now fluid restriction  -Discussed drinking water in moderation not to excess. Pt also was taking 30oz of decaf coffee
- resolved, s/p IVF and now fluid restriction  -Discussed drinking water in moderation not to excess. Pt also was taking 30oz of decaf coffee
- resolved  - 2' acute hyponatremia?

## 2023-06-13 NOTE — PROGRESS NOTE ADULT - SUBJECTIVE AND OBJECTIVE BOX
PROGRESS NOTE:   Carol Sanchez DO  Hospitalist  Pager [phone]  After 5pm/weekends or if no answer ext: [phone]      Patient is a 68y old  Male who presents with a chief complaint of Acute encephalopathy (12 Jun 2023 20:57)      SUBJECTIVE / OVERNIGHT EVENTS:  hypertensive overnight and symptomatic this morning  ADDITIONAL REVIEW OF SYSTEMS:    no fever or chills  no n/v/d    MEDICATIONS  (STANDING):  aspirin enteric coated 81 milliGRAM(s) Oral daily  atorvastatin 40 milliGRAM(s) Oral daily  cholecalciferol 2000 Unit(s) Oral daily  lactated ringers. 1000 milliLiter(s) (75 mL/Hr) IV Continuous <Continuous>  losartan 100 milliGRAM(s) Oral daily  multivitamin 1 Tablet(s) Oral daily  senna 2 Tablet(s) Oral at bedtime    MEDICATIONS  (PRN):  acetaminophen     Tablet .. 650 milliGRAM(s) Oral every 6 hours PRN Temp greater or equal to 38C (100.4F), Mild Pain (1 - 3)  bisacodyl Suppository 10 milliGRAM(s) Rectal daily PRN Constipation  melatonin 3 milliGRAM(s) Oral at bedtime PRN Insomnia  polyethylene glycol 3350 17 Gram(s) Oral daily PRN Constipation      CAPILLARY BLOOD GLUCOSE        I&O's Summary    12 Jun 2023 07:01  -  13 Jun 2023 07:00  --------------------------------------------------------  IN: 0 mL / OUT: 400 mL / NET: -400 mL        PHYSICAL EXAM:  Vital Signs Last 24 Hrs  T(C): 36.3 (13 Jun 2023 04:54), Max: 36.8 (12 Jun 2023 22:26)  T(F): 97.4 (13 Jun 2023 04:54), Max: 98.3 (12 Jun 2023 22:26)  HR: 85 (13 Jun 2023 09:39) (68 - 85)  BP: 164/95 (13 Jun 2023 09:39) (159/91 - 203/105)  BP(mean): --  RR: 18 (13 Jun 2023 09:39) (17 - 19)  SpO2: 97% (13 Jun 2023 09:39) (96% - 98%)    Parameters below as of 13 Jun 2023 09:39  Patient On (Oxygen Delivery Method): room air        CONSTITUTIONAL: NAD, well-developed, non toxic appering  RESPIRATORY: Normal respiratory effort; lungs are clear to auscultation bilaterally  CARDIOVASCULAR: Regular rate and rhythm, normal S1 and S2, no murmur/rub/gallop; No lower extremity edema; Peripheral pulses are 2+ bilaterally  ABDOMEN: Nontender to palpation, normoactive bowel sounds, no rebound/guarding; No hepatosplenomegaly  MUSCLOSKELETAL: no clubbing or cyanosis of digits; no joint swelling or tenderness to palpation  PSYCH: A+O to person, place, and time; affect appropriate    LABS:    06-13    x   |  x   |  x   ----------------------------<  x   x    |  x   |  1.32<H>    Ca    9.5      12 Jun 2023 07:12                  RADIOLOGY & ADDITIONAL TESTS:  Results Reviewed:   Imaging Personally Reviewed:  Electrocardiogram Personally Reviewed:    COORDINATION OF CARE:  Care Discussed with Consultants/Other Providers [Y/N]:  Prior or Outpatient Records Reviewed [Y/N]:

## 2023-06-13 NOTE — DISCHARGE NOTE NURSING/CASE MANAGEMENT/SOCIAL WORK - PATIENT PORTAL LINK FT
You can access the FollowMyHealth Patient Portal offered by [facility] by registering at the following website: [email or web address]. By joining [employer]’s FollowMyHealth portal, you will also be able to view your health information using other applications (apps) compatible with our system.

## 2023-06-13 NOTE — PROGRESS NOTE ADULT - SUBJECTIVE AND OBJECTIVE BOX
[facility]   [phone]  RENAL FOLLOW UP NOTE  --------------------------------------------------------------------------------  Chief Complaint:    24 hour events/subjective:    patient seen and examined.   lucho ibrahim cp    PAST HISTORY  --------------------------------------------------------------------------------  No significant changes to PMH, PSH, FHx, SHx, unless otherwise noted    ALLERGIES & MEDICATIONS  --------------------------------------------------------------------------------  Allergies    erythromycin (Unknown)    Intolerances      Standing Inpatient Medications  aspirin enteric coated 81 milliGRAM(s) Oral daily  atorvastatin 40 milliGRAM(s) Oral daily  bisacodyl 5 milliGRAM(s) Oral every 12 hours  cholecalciferol 2000 Unit(s) Oral daily  lactated ringers. 1000 milliLiter(s) IV Continuous <Continuous>  losartan 100 milliGRAM(s) Oral daily  multivitamin 1 Tablet(s) Oral daily  NIFEdipine XL 30 milliGRAM(s) Oral daily  senna 2 Tablet(s) Oral at bedtime    PRN Inpatient Medications  acetaminophen     Tablet .. 650 milliGRAM(s) Oral every 6 hours PRN  bisacodyl Suppository 10 milliGRAM(s) Rectal daily PRN  melatonin 3 milliGRAM(s) Oral at bedtime PRN  polyethylene glycol 3350 17 Gram(s) Oral daily PRN      REVIEW OF SYSTEMS  --------------------------------------------------------------------------------    Gen: denies fevers/chills,  CVS: denies chest pain/palpitations  Resp: denies SOB/Cough  GI: Denies N/V/Abd pain  : Denies dysuria/oliguria/hematuria    VITALS/PHYSICAL EXAM  --------------------------------------------------------------------------------  T(C): 36.9 (06-13-23 @ 12:39), Max: 36.9 (06-13-23 @ 12:39)  HR: 99 (06-13-23 @ 12:39) (68 - 99)  BP: 154/89 (06-13-23 @ 12:39) (154/89 - 203/105)  RR: 18 (06-13-23 @ 12:39) (17 - 19)  SpO2: 98% (06-13-23 @ 12:39) (97% - 98%)  Wt(kg): --        06-12-23 @ 07:01  -  06-13-23 @ 07:00  --------------------------------------------------------  IN: 0 mL / OUT: 400 mL / NET: -400 mL    06-13-23 @ 07:01  -  06-13-23 @ 14:40  --------------------------------------------------------  IN: 0 mL / OUT: 600 mL / NET: -600 mL      Physical Exam:  	              Gen: Non toxic comfortable appearing    	Pulm: decrease bs  no rales or ronchi or wheezing  	CV: RRR, S1S2; no rub  	Abd: +BS, soft, nontender/nondistended  	: No suprapubic tenderness  	UE: Warm, no cyanosis  no clubbing,  no edema   	LE: Warm, no cyanosis  no clubbing, no edema    LABS/STUDIES  --------------------------------------------------------------------------------    x   |  x   |  x   ----------------------------<  x       [06-13-23 @ 06:57]  x    |  x   |  1.32        Ca     9.5     [06-12-23 @ 07:12]            Creatinine Trend:  SCr 1.32 [06-13 @ 06:57]  SCr 1.50 [06-12 @ 07:12]  SCr 1.51 [06-11 @ 06:52]  SCr 1.43 [06-10 @ 06:46]  SCr 1.47 [06-09 @ 18:02]              Urinalysis - [06-08-23 @ 19:17]      Color Light Yellow / Appearance Clear / SG 1.015 / pH 7.0      Gluc Negative / Ketone Negative  / Bili Negative / Urobili Negative       Blood Negative / Protein 30 mg/dL / Leuk Est Negative / Nitrite Negative      RBC 0 / WBC 0 / Hyaline 0 / Gran  / Sq Epi  / Non Sq Epi  / Bacteria Negative    Urine Creatinine 32      [06-08-23 @ 19:17]  Urine Protein 36      [06-08-23 @ 19:17]  Urine Sodium 60      [06-08-23 @ 19:17]  Urine Urea Nitrogen 303      [06-08-23 @ 19:17]  Urine Potassium 26      [06-08-23 @ 19:17]  Urine Osmolality 300      [06-08-23 @ 19:17]    TSH 1.18      [06-09-23 @ 07:11]

## 2023-06-13 NOTE — PROVIDER CONTACT NOTE (EICU) - REASON
Patient has a electronic BP of 203/105 and a manual of 200/91 <-- Click to add NO pertinent Family History

## 2023-06-13 NOTE — DISCHARGE NOTE NURSING/CASE MANAGEMENT/SOCIAL WORK - NSDCPEFALRISK_GEN_ALL_CORE
For information on Fall & Injury Prevention, visit: [email or web address] OR  [email or web address] OR  https://www.cdc.gov/steadi/patient.html

## 2023-06-13 NOTE — PROGRESS NOTE ADULT - PROBLEM SELECTOR PROBLEM 3
CAD (coronary artery disease)
CAD (coronary artery disease)
Essential hypertension
CAD (coronary artery disease)
CAD (coronary artery disease)

## 2023-06-13 NOTE — PROGRESS NOTE ADULT - PROBLEM SELECTOR PLAN 4
- bp stable  -Cont. losartan daily
-BP still elevated will start 2nd agent- reached out to pts Cardio Dr. Chaz Hoyt to discuss if no response will start Nifidipine XL for 2nd agent.   - c/w losartan daily  -CT head negative
- bp stable  -Cont. losartan daily
- bp labile needs outpt f/u for this after Dc.   - c/w losartan daily  -CT head negative
- c/w atorvastatin

## 2023-06-13 NOTE — PROGRESS NOTE ADULT - NSPROGADDITIONALINFOA_GEN_ALL_CORE
disposition: dc planning pending pt and cleared by renal (for rising cr)- either 6/11 or 6/12    Celestina Michelle D.O.  Division of Hospital Medicine  Available on MS Teams
Pending stable BP and addition of 2nd agent will plan for DC today if BP remains stable  Discussed with wife over the phone
disposition: pending sodium improvement    Celestina Michelle D.O.  Division of Hospital Medicine  Available on MS Teams
disposition: pending sodium improvement- bmp q8h    Celestina Michelle D.O.  Division of Hospital Medicine  Available on MS Teams
Medically cleared if renal clears

## 2023-06-13 NOTE — PROGRESS NOTE ADULT - ASSESSMENT
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 CAD s/p CABG to LAD 2013, HTN, HLD p/w AMS. Pt splits time between [place] and [place]. Has been in [place] for between 1-2 months while his wife has been in [place]. Has been in contact with wife. Today, he was telling wife that he has been constipated for past 2-3 days. Unable to have BM. Wife suggested pt try to disimpact himself. Pt reports drinking lots of water during day and straining on toilet. Cannot specify how much. Did not take laxatives or meds. Ended up trying to disimpact himself in the afternoon and was successful afterwards. Called wife back around 4pm and wife felt he was very confused and did not make sense. Had friend come evaluate him and EMS was called to bring patient to hospital. as reported was Unable to provide accurate ROS on admission. pt noticed with na 121. slowly started to rise.  denies taking diuretics         1-  hyponatremia   2- abnormal serum creatinine   3- HTN   4- cad s/p cabg       pablo in setting of contrast nephropathy from cta done on admission   creatinine is improving   sodium improved.  continue losartan 100 mg daily  agree with added ccb for HTN  trend bp
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 CAD s/p CABG to LAD 2013, HTN, HLD p/w AMS. Pt splits time between [place] and [place]. Has been in [place] for between 1-2 months while his wife has been in [place]. Has been in contact with wife. Today, he was telling wife that he has been constipated for past 2-3 days. Unable to have BM. Wife suggested pt try to disimpact himself. Pt reports drinking lots of water during day and straining on toilet. Cannot specify how much. Did not take laxatives or meds. Ended up trying to disimpact himself in the afternoon and was successful afterwards. Called wife back around 4pm and wife felt he was very confused and did not make sense. Had friend come evaluate him and EMS was called to bring patient to hospital. as reported was Unable to provide accurate ROS on admission. pt noticed with na 121. slowly started to rise.  denies taking diuretics         1-  hyponatremia   2- abnormal serum creatinine   3- HTN   4- cad s/p cabg     na is better   pablo in setting of contrast nephropathy from cta done on admission   cr remains steady at this level   gentle ivf today   for now given cr stabilizing cont losartan 100 mg daily
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 CAD s/p CABG to LAD 2013, HTN, HLD p/w AMS. Pt splits time between [place] and [place]. Has been in [place] for between 1-2 months while his wife has been in [place]. Has been in contact with wife. Today, he was telling wife that he has been constipated for past 2-3 days. Unable to have BM. Wife suggested pt try to disimpact himself. Pt reports drinking lots of water during day and straining on toilet. Cannot specify how much. Did not take laxatives or meds. Ended up trying to disimpact himself in the afternoon and was successful afterwards. Called wife back around 4pm and wife felt he was very confused and did not make sense. Had friend come evaluate him and EMS was called to bring patient to hospital. as reported was Unable to provide accurate ROS on admission. pt noticed with na 121. slowly started to rise.  denies taking diuretics         1-  hyponatremia   2- abnormal serum creatinine   3- HTN   4- cad s/p cabg     na is better   pablo in setting of contrast nephropathy from cta done on admission   cr seems to be stabilizing   i would still cont to keep pt at one liter fluid restriction   for now given cr stabilizing cont losartan 100 mg daily    d/w Dr. Michelle
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 CAD s/p CABG to LAD 2013, HTN, HLD p/w acute encephalopathy in setting of hyponatremia

## 2023-06-13 NOTE — PROGRESS NOTE ADULT - PROBLEM SELECTOR PLAN 3
S/p CABG 2013  -Cont. asa and statin
S/p CABG 2013  - c/w asa and statin
- bp labile   - c/w losartan daily
S/p CABG 2013  - c/w asa and statin
S/p CABG 2013  -Cont. asa and statin

## 2023-06-13 NOTE — PROGRESS NOTE ADULT - PROBLEM SELECTOR PROBLEM 4
Essential hypertension
Hyperlipidemia

## 2023-06-13 NOTE — PROGRESS NOTE ADULT - PROBLEM SELECTOR PROBLEM 2
LUIS (acute kidney injury)
Hyponatremia
LUIS (acute kidney injury)
CAD (coronary artery disease)
Hyponatremia

## 2023-06-13 NOTE — PROGRESS NOTE ADULT - PROBLEM SELECTOR PLAN 2
- improving  - c/w NS IVF for now, pending renal f/u  - bmp q8h  - discussed with dr bar from nephrology 6/9
Contrast induced per renal from CTAs on admission stroke code  Now stable  outpt f/u for cr check within 2 weeks
- repeat bmp pending  - pending bmp results may start IVF or hypertonic saline  - discussed with dr bar from nephrology
Contrast induced per renal from CTAs on admission stroke code  Now stable  outpt f/u for cr check within 2 weeks
S/p CABG 2013  - c/w asa and statin

## 2023-06-13 NOTE — PROGRESS NOTE ADULT - REASON FOR ADMISSION
Acute encephalopathy

## 2023-06-14 VITALS
TEMPERATURE: 97 F | HEART RATE: 89 BPM | SYSTOLIC BLOOD PRESSURE: 145 MMHG | RESPIRATION RATE: 18 BRPM | OXYGEN SATURATION: 97 % | DIASTOLIC BLOOD PRESSURE: 78 MMHG

## 2023-06-14 LAB
CULTURE RESULTS: SIGNIFICANT CHANGE UP
CULTURE RESULTS: SIGNIFICANT CHANGE UP
SPECIMEN SOURCE: SIGNIFICANT CHANGE UP
SPECIMEN SOURCE: SIGNIFICANT CHANGE UP

## 2023-06-14 PROCEDURE — 71045 X-RAY EXAM CHEST 1 VIEW: CPT

## 2023-06-14 PROCEDURE — 84145 PROCALCITONIN (PCT): CPT

## 2023-06-14 PROCEDURE — 87635 SARS-COV-2 COVID-19 AMP PRB: CPT

## 2023-06-14 PROCEDURE — 82947 ASSAY GLUCOSE BLOOD QUANT: CPT

## 2023-06-14 PROCEDURE — 83605 ASSAY OF LACTIC ACID: CPT

## 2023-06-14 PROCEDURE — 70498 CT ANGIOGRAPHY NECK: CPT | Mod: MA

## 2023-06-14 PROCEDURE — 82435 ASSAY OF BLOOD CHLORIDE: CPT

## 2023-06-14 PROCEDURE — 80053 COMPREHEN METABOLIC PANEL: CPT

## 2023-06-14 PROCEDURE — 84156 ASSAY OF PROTEIN URINE: CPT

## 2023-06-14 PROCEDURE — 84540 ASSAY OF URINE/UREA-N: CPT

## 2023-06-14 PROCEDURE — 36415 COLL VENOUS BLD VENIPUNCTURE: CPT

## 2023-06-14 PROCEDURE — 85610 PROTHROMBIN TIME: CPT

## 2023-06-14 PROCEDURE — 84132 ASSAY OF SERUM POTASSIUM: CPT

## 2023-06-14 PROCEDURE — 82803 BLOOD GASES ANY COMBINATION: CPT

## 2023-06-14 PROCEDURE — 82962 GLUCOSE BLOOD TEST: CPT

## 2023-06-14 PROCEDURE — 87040 BLOOD CULTURE FOR BACTERIA: CPT

## 2023-06-14 PROCEDURE — 84295 ASSAY OF SERUM SODIUM: CPT

## 2023-06-14 PROCEDURE — 83735 ASSAY OF MAGNESIUM: CPT

## 2023-06-14 PROCEDURE — 84484 ASSAY OF TROPONIN QUANT: CPT

## 2023-06-14 PROCEDURE — 84300 ASSAY OF URINE SODIUM: CPT

## 2023-06-14 PROCEDURE — 99285 EMERGENCY DEPT VISIT HI MDM: CPT

## 2023-06-14 PROCEDURE — 82565 ASSAY OF CREATININE: CPT

## 2023-06-14 PROCEDURE — 85018 HEMOGLOBIN: CPT

## 2023-06-14 PROCEDURE — 81001 URINALYSIS AUTO W/SCOPE: CPT

## 2023-06-14 PROCEDURE — 84100 ASSAY OF PHOSPHORUS: CPT

## 2023-06-14 PROCEDURE — 70496 CT ANGIOGRAPHY HEAD: CPT | Mod: MA

## 2023-06-14 PROCEDURE — 85027 COMPLETE CBC AUTOMATED: CPT

## 2023-06-14 PROCEDURE — 85025 COMPLETE CBC W/AUTO DIFF WBC: CPT

## 2023-06-14 PROCEDURE — 80048 BASIC METABOLIC PNL TOTAL CA: CPT

## 2023-06-14 PROCEDURE — 82570 ASSAY OF URINE CREATININE: CPT

## 2023-06-14 PROCEDURE — 84443 ASSAY THYROID STIM HORMONE: CPT

## 2023-06-14 PROCEDURE — 85014 HEMATOCRIT: CPT

## 2023-06-14 PROCEDURE — 85730 THROMBOPLASTIN TIME PARTIAL: CPT

## 2023-06-14 PROCEDURE — 84133 ASSAY OF URINE POTASSIUM: CPT

## 2023-06-14 PROCEDURE — 83935 ASSAY OF URINE OSMOLALITY: CPT

## 2023-06-14 PROCEDURE — 97161 PT EVAL LOW COMPLEX 20 MIN: CPT

## 2023-06-14 PROCEDURE — 82805 BLOOD GASES W/O2 SATURATION: CPT

## 2023-06-14 PROCEDURE — 84550 ASSAY OF BLOOD/URIC ACID: CPT

## 2023-06-14 PROCEDURE — 70450 CT HEAD/BRAIN W/O DYE: CPT | Mod: MA

## 2023-06-14 PROCEDURE — 86803 HEPATITIS C AB TEST: CPT

## 2023-06-14 PROCEDURE — 87449 NOS EACH ORGANISM AG IA: CPT

## 2023-06-14 PROCEDURE — 0042T: CPT | Mod: MA

## 2023-06-14 PROCEDURE — 82330 ASSAY OF CALCIUM: CPT

## 2023-06-14 RX ADMIN — LOSARTAN POTASSIUM 100 MILLIGRAM(S): 100 TABLET, FILM COATED ORAL at 06:40

## 2023-06-14 RX ADMIN — Medication 2000 UNIT(S): at 11:18

## 2023-06-14 RX ADMIN — Medication 81 MILLIGRAM(S): at 11:18

## 2023-06-14 RX ADMIN — Medication 30 MILLIGRAM(S): at 06:41

## 2023-06-14 RX ADMIN — Medication 1 TABLET(S): at 11:18

## 2023-06-14 RX ADMIN — ATORVASTATIN CALCIUM 40 MILLIGRAM(S): 80 TABLET, FILM COATED ORAL at 11:17

## 2023-10-11 RX ORDER — ATORVASTATIN CALCIUM 80 MG/1
1 TABLET, FILM COATED ORAL
Refills: 0 | DISCHARGE

## 2023-10-11 RX ORDER — LOSARTAN POTASSIUM 100 MG/1
1 TABLET, FILM COATED ORAL
Refills: 0 | DISCHARGE

## 2023-10-11 RX ORDER — CHOLECALCIFEROL (VITAMIN D3) 125 MCG
1 CAPSULE ORAL
Refills: 0 | DISCHARGE

## 2023-10-11 RX ORDER — ASPIRIN/CALCIUM CARB/MAGNESIUM 324 MG
1 TABLET ORAL
Refills: 0 | DISCHARGE

## 2024-06-24 NOTE — ED ADULT NURSE REASSESSMENT NOTE - TEMPLATE LIST FOR HEAD TO TOE ASSESSMENT
Recommendations:  -Stop the Butrans patch.  -New prescription sent for oxycodone.  You can take 1 to 2 tablets up to every 4 hours as needed for severe pain.  Please let me know how this works for you.  -We talked about a referral to another doctor in the physical medicine and rehabilitation specialty (PM&R) once you are through radiation.    Follow up: 61-2 months      Reasons to Call    If you are having worsening/uncontrolled symptoms we want you to call!    You or your other physicians make any changes to medications we have prescribed.  -Please call for refills 4-5 days before you will run out of medication.    Important Phone Numbers, including: Refills, scheduling, and general questions     Palliative Care RN: Aisha Shelton - [phone]  *After hours or on weekends. Will connect you with on call MD. [phone]    
Neuro

## 2024-11-29 NOTE — DIETITIAN INITIAL EVALUATION ADULT - REASON INDICATOR FOR ASSESSMENT
No
MST Score 2 or >  Information obtained from: Review of pt's current medical record, interview with pt in his assigned room on [facility]

## 2025-01-08 NOTE — DIETITIAN INITIAL EVALUATION ADULT - PROBLEM SELECTOR PROBLEM 5
----- Message from Zaheer Ordaz MD sent at 1/7/2025  6:15 PM CST -----  Please notify patient normal esophageal biopsies.  Normal gastric biopsies.  No change in plan of care.  
Noted. Spoke with pt and conveyed results and recommendations. Pt understands and denies questions at this time.   
Essential hypertension